# Patient Record
Sex: FEMALE | Race: WHITE | Employment: STUDENT | URBAN - METROPOLITAN AREA
[De-identification: names, ages, dates, MRNs, and addresses within clinical notes are randomized per-mention and may not be internally consistent; named-entity substitution may affect disease eponyms.]

---

## 2021-04-14 ENCOUNTER — OFFICE VISIT (OUTPATIENT)
Dept: FAMILY MEDICINE CLINIC | Facility: CLINIC | Age: 21
End: 2021-04-14

## 2021-04-14 VITALS
WEIGHT: 136 LBS | TEMPERATURE: 98 F | HEIGHT: 62 IN | HEART RATE: 115 BPM | BODY MASS INDEX: 25.03 KG/M2 | DIASTOLIC BLOOD PRESSURE: 87 MMHG | SYSTOLIC BLOOD PRESSURE: 132 MMHG

## 2021-04-14 DIAGNOSIS — J30.9 ALLERGIC RHINITIS, UNSPECIFIED SEASONALITY, UNSPECIFIED TRIGGER: Primary | ICD-10-CM

## 2021-04-14 PROCEDURE — 3079F DIAST BP 80-89 MM HG: CPT | Performed by: NURSE PRACTITIONER

## 2021-04-14 PROCEDURE — 3075F SYST BP GE 130 - 139MM HG: CPT | Performed by: NURSE PRACTITIONER

## 2021-04-14 PROCEDURE — 99203 OFFICE O/P NEW LOW 30 MIN: CPT | Performed by: NURSE PRACTITIONER

## 2021-04-14 PROCEDURE — 3008F BODY MASS INDEX DOCD: CPT | Performed by: NURSE PRACTITIONER

## 2021-04-14 RX ORDER — FLUTICASONE PROPIONATE 50 MCG
2 SPRAY, SUSPENSION (ML) NASAL DAILY
Qty: 3 BOTTLE | Refills: 3 | Status: SHIPPED | OUTPATIENT
Start: 2021-04-14 | End: 2022-04-09

## 2021-04-14 NOTE — PROGRESS NOTES
HPI    Patient presents for left ear drainage x 5 days which has now passed. Thought may be related to allergies since was also having some eye itchiness and congestion. Has been taking claritin and benadryl as needed with relief of symptoms.       With file    Social History Narrative      Not on file    Social Determinants of Health  Financial Resource Strain:       Difficulty of Paying Living Expenses:   Food Insecurity:       Worried About [address] in the Last Year:       Ran Out of Food in sounds: Normal heart sounds. No murmur heard. Pulmonary:      Effort: Pulmonary effort is normal. No respiratory distress. Breath sounds: Normal breath sounds. No stridor. No wheezing, rhonchi or rales. Chest:      Chest wall: No tenderness.    Torsten Kaur

## 2021-04-14 NOTE — PATIENT INSTRUCTIONS
Allergic Rhinitis  Allergic rhinitis is an allergic reaction that affects the nose, and often the eyes. It’s often known as nasal allergies. Nasal allergies are often due to things in the environment that are breathed in.  Depending what you are sensitive conditioner clean and free of mold. · Clean moldy areas with bleach and water. Don't mix bleach with other . In general:  · Vacuum once or twice a week. If possible, use a vacuum with a high-efficiency particulate air (HEPA) filter.   · Don't smok

## 2021-07-02 ENCOUNTER — TELEPHONE (OUTPATIENT)
Dept: FAMILY MEDICINE CLINIC | Facility: CLINIC | Age: 21
End: 2021-07-02

## 2021-07-02 ENCOUNTER — HOSPITAL ENCOUNTER (EMERGENCY)
Facility: HOSPITAL | Age: 21
Discharge: HOME | End: 2021-07-02
Attending: EMERGENCY MEDICINE
Payer: COMMERCIAL

## 2021-07-02 ENCOUNTER — OFFICE VISIT (OUTPATIENT)
Dept: INTERNAL MEDICINE CLINIC | Facility: CLINIC | Age: 21
End: 2021-07-02

## 2021-07-02 ENCOUNTER — APPOINTMENT (OUTPATIENT)
Dept: CT IMAGING | Facility: HOSPITAL | Age: 21
End: 2021-07-02
Attending: EMERGENCY MEDICINE
Payer: COMMERCIAL

## 2021-07-02 VITALS
RESPIRATION RATE: 19 BRPM | DIASTOLIC BLOOD PRESSURE: 66 MMHG | HEART RATE: 72 BPM | SYSTOLIC BLOOD PRESSURE: 107 MMHG | TEMPERATURE: 98 F | BODY MASS INDEX: 25.16 KG/M2 | OXYGEN SATURATION: 100 % | HEIGHT: 61.5 IN | WEIGHT: 135 LBS

## 2021-07-02 VITALS
HEART RATE: 81 BPM | BODY MASS INDEX: 25.09 KG/M2 | WEIGHT: 134.63 LBS | SYSTOLIC BLOOD PRESSURE: 122 MMHG | TEMPERATURE: 99 F | DIASTOLIC BLOOD PRESSURE: 76 MMHG | HEIGHT: 61.5 IN
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
## 2021-07-02 DIAGNOSIS — S39.91XA ABDOMINAL TRAUMA, INITIAL ENCOUNTER: Primary | ICD-10-CM

## 2021-07-02 DIAGNOSIS — T14.8XXA ABRASION: ICD-10-CM
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
## 2021-07-02 LAB
ANION GAP SERPL CALC-SCNC: 4 MMOL/L (ref 0–18)
B-HCG UR QL: NEGATIVE
B-HCG UR QL: NEGATIVE
BASOPHILS # BLD AUTO: 0.03 X10(3) UL (ref 0–0.2)
BASOPHILS NFR BLD AUTO: 0.4 %
BILIRUB UR QL: NEGATIVE
BILIRUB UR QL: NEGATIVE
BUN BLD-MCNC: 10 MG/DL (ref 7–18)
BUN/CREAT SERPL: 14.7 (ref 10–20)
CALCIUM BLD-MCNC: 10 MG/DL (ref 8.5–10.1)
CHLORIDE SERPL-SCNC: 107 MMOL/L (ref 98–112)
CLARITY UR: CLEAR
CLARITY UR: CLEAR
CO2 SERPL-SCNC: 28 MMOL/L (ref 21–32)
COLOR UR: COLORLESS
COLOR UR: COLORLESS
CREAT BLD-MCNC: 0.68 MG/DL
DEPRECATED RDW RBC AUTO: 46.8 FL (ref 35.1–46.3)
EOSINOPHIL # BLD AUTO: 0.02 X10(3) UL (ref 0–0.7)
EOSINOPHIL NFR BLD AUTO: 0.2 %
ERYTHROCYTE [DISTWIDTH] IN BLOOD BY AUTOMATED COUNT: 13.6 % (ref 11–15)
GLUCOSE BLD-MCNC: 96 MG/DL (ref 70–99)
GLUCOSE UR-MCNC: NEGATIVE MG/DL
GLUCOSE UR-MCNC: NEGATIVE MG/DL
HCT VFR BLD AUTO: 41.3 %
HGB BLD-MCNC: 13.2 G/DL
HGB UR QL STRIP.AUTO: NEGATIVE
HGB UR QL STRIP.AUTO: NEGATIVE
IMM GRANULOCYTES # BLD AUTO: 0.02 X10(3) UL (ref 0–1)
IMM GRANULOCYTES NFR BLD: 0.2 %
KETONES UR-MCNC: NEGATIVE MG/DL
KETONES UR-MCNC: NEGATIVE MG/DL
LYMPHOCYTES # BLD AUTO: 2.34 X10(3) UL (ref 1–4)
LYMPHOCYTES NFR BLD AUTO: 29.1 %
MCH RBC QN AUTO: 29.5 PG (ref 26–34)
MCHC RBC AUTO-ENTMCNC: 32 G/DL (ref 31–37)
MCV RBC AUTO: 92.4 FL
MONOCYTES # BLD AUTO: 0.43 X10(3) UL (ref 0.1–1)
MONOCYTES NFR BLD AUTO: 5.4 %
NEUTROPHILS # BLD AUTO: 5.19 X10 (3) UL (ref 1.5–7.7)
NEUTROPHILS # BLD AUTO: 5.19 X10(3) UL (ref 1.5–7.7)
NEUTROPHILS NFR BLD AUTO: 64.7 %
NITRITE UR QL STRIP.AUTO: NEGATIVE
NITRITE UR QL STRIP.AUTO: NEGATIVE
OSMOLALITY SERPL CALC.SUM OF ELEC: 287 MOSM/KG (ref 275–295)
PH UR: 7 [PH] (ref 5–8)
PH UR: 7 [PH] (ref 5–8)
PLATELET # BLD AUTO: 375 10(3)UL (ref 150–450)
POTASSIUM SERPL-SCNC: 4 MMOL/L (ref 3.5–5.1)
PROT UR-MCNC: NEGATIVE MG/DL
PROT UR-MCNC: NEGATIVE MG/DL
RBC # BLD AUTO: 4.47 X10(6)UL
SODIUM SERPL-SCNC: 139 MMOL/L (ref 136–145)
SP GR UR STRIP: 1 (ref 1–1.03)
SP GR UR STRIP: 1 (ref 1–1.03)
UROBILINOGEN UR STRIP-ACNC: <2
UROBILINOGEN UR STRIP-ACNC: <2
WBC # BLD AUTO: 8 X10(3) UL (ref 4–11)

## 2021-07-02 PROCEDURE — 3074F SYST BP LT 130 MM HG: CPT | Performed by: INTERNAL MEDICINE

## 2021-07-02 PROCEDURE — 99284 EMERGENCY DEPT VISIT MOD MDM: CPT

## 2021-07-02 PROCEDURE — 36415 COLL VENOUS BLD VENIPUNCTURE: CPT

## 2021-07-02 PROCEDURE — 85025 COMPLETE CBC W/AUTO DIFF WBC: CPT

## 2021-07-02 PROCEDURE — 74176 CT ABD & PELVIS W/O CONTRAST: CPT | Performed by: EMERGENCY MEDICINE

## 2021-07-02 PROCEDURE — 81025 URINE PREGNANCY TEST: CPT

## 2021-07-02 PROCEDURE — 80048 BASIC METABOLIC PNL TOTAL CA: CPT | Performed by: EMERGENCY MEDICINE

## 2021-07-02 PROCEDURE — 85025 COMPLETE CBC W/AUTO DIFF WBC: CPT | Performed by: EMERGENCY MEDICINE

## 2021-07-02 PROCEDURE — 99204 OFFICE O/P NEW MOD 45 MIN: CPT | Performed by: INTERNAL MEDICINE

## 2021-07-02 PROCEDURE — 81001 URINALYSIS AUTO W/SCOPE: CPT | Performed by: EMERGENCY MEDICINE

## 2021-07-02 PROCEDURE — 3078F DIAST BP <80 MM HG: CPT | Performed by: INTERNAL MEDICINE

## 2021-07-02 PROCEDURE — 80048 BASIC METABOLIC PNL TOTAL CA: CPT

## 2021-07-02 PROCEDURE — 3008F BODY MASS INDEX DOCD: CPT | Performed by: INTERNAL MEDICINE
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
## 2021-07-27 ENCOUNTER — HOSPITAL ENCOUNTER (OUTPATIENT)
Dept: GENERAL RADIOLOGY | Age: 21
Discharge: HOME | End: 2021-07-27
Attending: NURSE PRACTITIONER
Payer: COMMERCIAL

## 2021-07-27 ENCOUNTER — OFFICE VISIT (OUTPATIENT)
Dept: FAMILY MEDICINE CLINIC | Facility: CLINIC | Age: 21
End: 2021-07-27

## 2021-07-27 VITALS
HEIGHT: 61.5 IN | HEART RATE: 71 BPM | DIASTOLIC BLOOD PRESSURE: 80 MMHG | SYSTOLIC BLOOD PRESSURE: 123 MMHG | WEIGHT: 140 LBS | BODY MASS INDEX: 26.09 KG/M2

## 2021-07-27 DIAGNOSIS — M25.562 ACUTE PAIN OF LEFT KNEE: ICD-10-CM

## 2021-07-27 DIAGNOSIS — M62.830 SPASM OF THORACIC BACK MUSCLE: ICD-10-CM

## 2021-07-27 DIAGNOSIS — M79.605 LEFT LEG PAIN: Primary | ICD-10-CM

## 2021-07-27 DIAGNOSIS — M79.605 LEFT LEG PAIN: ICD-10-CM

## 2021-07-27 PROCEDURE — 3074F SYST BP LT 130 MM HG: CPT | Performed by: NURSE PRACTITIONER

## 2021-07-27 PROCEDURE — 73590 X-RAY EXAM OF LOWER LEG: CPT | Performed by: NURSE PRACTITIONER

## 2021-07-27 PROCEDURE — 73564 X-RAY EXAM KNEE 4 OR MORE: CPT | Performed by: NURSE PRACTITIONER

## 2021-07-27 PROCEDURE — 3008F BODY MASS INDEX DOCD: CPT | Performed by: NURSE PRACTITIONER

## 2021-07-27 PROCEDURE — 99214 OFFICE O/P EST MOD 30 MIN: CPT | Performed by: NURSE PRACTITIONER

## 2021-07-27 PROCEDURE — 3079F DIAST BP 80-89 MM HG: CPT | Performed by: NURSE PRACTITIONER

## 2021-07-27 RX ORDER — IBUPROFEN 600 MG/1
600 TABLET ORAL EVERY 6 HOURS PRN
Qty: 40 TABLET | Refills: 0 | Status: SHIPPED | OUTPATIENT
Start: 2021-07-27 | End: 2021-08-26

## 2021-07-27 RX ORDER — CYCLOBENZAPRINE HCL 5 MG
5 TABLET ORAL 3 TIMES DAILY PRN
Qty: 30 TABLET | Refills: 0 | Status: SHIPPED | OUTPATIENT
Start: 2021-07-27 | End: 2021-10-20 | Stop reason: ALTCHOICE

## 2021-08-10 ENCOUNTER — TELEPHONE (OUTPATIENT)
Dept: PHYSICAL THERAPY | Facility: HOSPITAL | Age: 21
End: 2021-08-10

## 2021-08-11 ENCOUNTER — TELEPHONE (OUTPATIENT)
Dept: PHYSICAL THERAPY | Facility: HOSPITAL | Age: 21
End: 2021-08-11

## 2021-08-11 ENCOUNTER — OFFICE VISIT (OUTPATIENT)
Dept: PHYSICAL THERAPY | Age: 21
End: 2021-08-11
Attending: NURSE PRACTITIONER
Payer: COMMERCIAL

## 2021-08-11 DIAGNOSIS — M79.605 LEFT LEG PAIN: ICD-10-CM

## 2021-08-11 DIAGNOSIS — M25.562 ACUTE PAIN OF LEFT KNEE: ICD-10-CM

## 2021-08-11 PROCEDURE — 97161 PT EVAL LOW COMPLEX 20 MIN: CPT | Performed by: PHYSICAL THERAPIST

## 2021-08-11 PROCEDURE — 97110 THERAPEUTIC EXERCISES: CPT | Performed by: PHYSICAL THERAPIST

## 2021-08-11 NOTE — PROGRESS NOTES
LOWER EXTREMITY EVALUATION:   Referring Physician: Kal DELCID  Diagnosis:L knee pain     Date of Service: 8/11/2021     PATIENT SUMMARY   Moses Devine is a 24year old female who presents to therapy today with complaints of L knee pain after bein reach functional goals. WIll focus on hip/knee strengthening open and closed chain, progress to plyometric as able.      Precautions:  None  OBJECTIVE:     Palpation: slight tenderness Lateral to patellar tendon L knee  Sensation: intact    AROM: (* denotes all activities. 3. Patient independent with HEP. Frequency / Duration: Patient will be seen for 2 x/week or a total of 10-12 visits over a 90 day period. Treatment will include:  Therapeutic Exercise and Home Exercise Program instruction    Education

## 2021-08-13 ENCOUNTER — OFFICE VISIT (OUTPATIENT)
Dept: PHYSICAL THERAPY | Age: 21
End: 2021-08-13
Attending: NURSE PRACTITIONER
Payer: COMMERCIAL

## 2021-08-13 DIAGNOSIS — M25.562 ACUTE PAIN OF LEFT KNEE: ICD-10-CM

## 2021-08-13 DIAGNOSIS — M79.605 LEFT LEG PAIN: ICD-10-CM

## 2021-08-13 PROCEDURE — 97110 THERAPEUTIC EXERCISES: CPT | Performed by: PHYSICAL THERAPIST

## 2021-08-13 NOTE — PROGRESS NOTES
Dx: L Knee pain        Insurance (Authorized # of Visits):  2           Authorizing Physician: FARHANA Moeller MD visit: none scheduled  Fall Risk: standard         Precautions: n/a             Subjective: Patient reports had pain at knee the other

## 2021-08-18 ENCOUNTER — TELEPHONE (OUTPATIENT)
Dept: PHYSICAL THERAPY | Facility: HOSPITAL | Age: 21
End: 2021-08-18

## 2021-08-18 ENCOUNTER — OFFICE VISIT (OUTPATIENT)
Dept: PHYSICAL THERAPY | Age: 21
End: 2021-08-18
Attending: NURSE PRACTITIONER
Payer: COMMERCIAL

## 2021-08-18 PROCEDURE — 97110 THERAPEUTIC EXERCISES: CPT | Performed by: PHYSICAL THERAPIST

## 2021-08-18 NOTE — PROGRESS NOTES
Dx: L Knee pain, L hip pain        Insurance (Authorized # of Visits):  3           Authorizing Physician: FARHANA Gonzalez Next MD visit: none scheduled  Fall Risk: standard         Precautions: n/a             Subjective: Patient reports able to comple

## 2021-08-20 ENCOUNTER — OFFICE VISIT (OUTPATIENT)
Dept: PHYSICAL THERAPY | Age: 21
End: 2021-08-20
Attending: NURSE PRACTITIONER
Payer: COMMERCIAL

## 2021-08-20 PROCEDURE — 97110 THERAPEUTIC EXERCISES: CPT | Performed by: PHYSICAL THERAPIST

## 2021-08-20 NOTE — PROGRESS NOTES
Dx: L Knee pain, L hip pain        Insurance (Authorized # of Visits):  4           Authorizing Physician: FARHANA Pratt Next MD visit: none scheduled  Fall Risk: standard         Precautions: n/a             Subjective: Patient reports hip is feeling

## 2021-08-24 ENCOUNTER — OFFICE VISIT (OUTPATIENT)
Dept: PHYSICAL THERAPY | Age: 21
End: 2021-08-24
Attending: NURSE PRACTITIONER
Payer: COMMERCIAL

## 2021-08-24 PROCEDURE — 97110 THERAPEUTIC EXERCISES: CPT | Performed by: PHYSICAL THERAPIST

## 2021-08-24 NOTE — PROGRESS NOTES
Dx: L Knee pain, L hip pain        Insurance (Authorized # of Visits):  5/8          Authorizing Physician: FARHANA Powell Next MD visit: none scheduled  Fall Risk: standard         Precautions: n/a             Subjective: Patient reports hip is feelin in place jumps    Charges: Ex 3      Total Timed Treatment: 45 min  Total Treatment Time: 45 min

## 2021-08-25 ENCOUNTER — TELEPHONE (OUTPATIENT)
Dept: PHYSICAL THERAPY | Facility: HOSPITAL | Age: 21
End: 2021-08-25

## 2021-08-26 ENCOUNTER — APPOINTMENT (OUTPATIENT)
Dept: PHYSICAL THERAPY | Age: 21
End: 2021-08-26
Attending: NURSE PRACTITIONER
Payer: COMMERCIAL

## 2021-09-09 ENCOUNTER — OFFICE VISIT (OUTPATIENT)
Dept: PHYSICAL THERAPY | Age: 21
End: 2021-09-09
Attending: NURSE PRACTITIONER
Payer: COMMERCIAL

## 2021-09-09 PROCEDURE — 97110 THERAPEUTIC EXERCISES: CPT | Performed by: PHYSICAL THERAPIST

## 2021-09-09 NOTE — PROGRESS NOTES
Dx: L Knee pain, L hip pain        Insurance (Authorized # of Visits):  6/8          Authorizing Physician: FARHANA Lynch Next MD visit: none scheduled  Fall Risk: standard         Precautions: n/a             Subjective: Patient reports L knee has be

## 2021-09-16 ENCOUNTER — OFFICE VISIT (OUTPATIENT)
Dept: PHYSICAL THERAPY | Age: 21
End: 2021-09-16
Attending: NURSE PRACTITIONER
Payer: COMMERCIAL

## 2021-09-16 PROCEDURE — 97110 THERAPEUTIC EXERCISES: CPT | Performed by: PHYSICAL THERAPIST

## 2021-09-16 NOTE — PROGRESS NOTES
Dx: L Knee pain, L hip pain        Insurance (Authorized # of Visits):  7/8          Authorizing Physician: FARHANA Allred Next MD visit: none scheduled  Fall Risk: standard         Precautions: n/a             Subjective: Patient reports knee and hip

## 2021-09-23 ENCOUNTER — OFFICE VISIT (OUTPATIENT)
Dept: FAMILY MEDICINE CLINIC | Facility: CLINIC | Age: 21
End: 2021-09-23
Payer: COMMERCIAL

## 2021-09-23 ENCOUNTER — OFFICE VISIT (OUTPATIENT)
Dept: PHYSICAL THERAPY | Age: 21
End: 2021-09-23
Attending: NURSE PRACTITIONER
Payer: COMMERCIAL

## 2021-09-23 VITALS
WEIGHT: 139 LBS | BODY MASS INDEX: 25.91 KG/M2 | HEART RATE: 69 BPM | SYSTOLIC BLOOD PRESSURE: 123 MMHG | TEMPERATURE: 99 F | DIASTOLIC BLOOD PRESSURE: 73 MMHG | HEIGHT: 61.5 IN

## 2021-09-23 DIAGNOSIS — Z87.828 HISTORY OF MOTOR VEHICLE ACCIDENT: Primary | ICD-10-CM

## 2021-09-23 PROCEDURE — 3074F SYST BP LT 130 MM HG: CPT | Performed by: NURSE PRACTITIONER

## 2021-09-23 PROCEDURE — 99213 OFFICE O/P EST LOW 20 MIN: CPT | Performed by: NURSE PRACTITIONER

## 2021-09-23 PROCEDURE — 3008F BODY MASS INDEX DOCD: CPT | Performed by: NURSE PRACTITIONER

## 2021-09-23 PROCEDURE — 3078F DIAST BP <80 MM HG: CPT | Performed by: NURSE PRACTITIONER

## 2021-09-23 PROCEDURE — 97110 THERAPEUTIC EXERCISES: CPT | Performed by: PHYSICAL THERAPIST

## 2021-09-23 NOTE — PROGRESS NOTES
HPI    Patient presents for follow up for spasm of thoracic back, left leg and left knee pain. Completed PT today. Pain is improved. No longer taking pain medications for relief. Review of Systems   All other systems reviewed and are negative. the Last Year: Not on file  Transportation Needs:       Lack of Transportation (Medical): Not on file      Lack of Transportation (Non-Medical):  Not on file  Physical Activity:       Days of Exercise per Week: Not on file      Minutes of Exercise per [facility] Breath sounds: Normal breath sounds. No stridor. No wheezing, rhonchi or rales. Chest:      Chest wall: No tenderness. Skin:     General: Skin is warm and dry. Neurological:      Mental Status: She is alert and oriented to person, place, and time.

## 2021-09-23 NOTE — PROGRESS NOTES
Dx: L Knee pain, L hip pain        Insurance (Authorized # of Visits):  8/8          Authorizing Physician: [facility]-FARHANA LANCASTER Next MD visit: none scheduled  Fall Risk: standard         Precautions: n/a           DIscharge Summary  Subjective: Patient repo band  Single leg bridge x 15        Single leg balance with ball toss on air ex x 12 frwd, side R, side L  Inverted Bosu squats x 15  Bosu to trampoline various angles x 15  Side hip abd walks blueTB x 15 each  Frwd/back diagonal walks blueTB x 15 frwd, 15

## 2021-10-20 ENCOUNTER — OFFICE VISIT (OUTPATIENT)
Dept: FAMILY MEDICINE CLINIC | Facility: CLINIC | Age: 21
End: 2021-10-20

## 2021-10-20 VITALS
DIASTOLIC BLOOD PRESSURE: 77 MMHG | HEIGHT: 61.5 IN | WEIGHT: 134 LBS | SYSTOLIC BLOOD PRESSURE: 111 MMHG | HEART RATE: 121 BPM | BODY MASS INDEX: 24.98 KG/M2 | TEMPERATURE: 98 F

## 2021-10-20 DIAGNOSIS — Z23 ENCOUNTER FOR ADMINISTRATION OF VACCINE: ICD-10-CM

## 2021-10-20 DIAGNOSIS — Z00.00 WELL ADULT EXAM: Primary | ICD-10-CM

## 2021-10-20 PROCEDURE — 3074F SYST BP LT 130 MM HG: CPT | Performed by: NURSE PRACTITIONER

## 2021-10-20 PROCEDURE — 99395 PREV VISIT EST AGE 18-39: CPT | Performed by: NURSE PRACTITIONER

## 2021-10-20 PROCEDURE — 3078F DIAST BP <80 MM HG: CPT | Performed by: NURSE PRACTITIONER

## 2021-10-20 PROCEDURE — 3008F BODY MASS INDEX DOCD: CPT | Performed by: NURSE PRACTITIONER

## 2021-10-20 PROCEDURE — 90471 IMMUNIZATION ADMIN: CPT | Performed by: NURSE PRACTITIONER

## 2021-10-20 PROCEDURE — 90715 TDAP VACCINE 7 YRS/> IM: CPT | Performed by: NURSE PRACTITIONER

## 2021-10-20 NOTE — PROGRESS NOTES
HPI    Patient presents for annual physical.  Negative for past medical history. Gyne history - G0. Last pap - not sexually active. LMP -   Diet - diet is healthy. Exercise - exercises regularly.     Immunizations - refusing flu shot, would like Marital status: Single      Spouse name: Not on file      Number of children: Not on file      Years of education: Not on file      Highest education level: Not on file    Occupational History      Not on file    Tobacco Use      Smoking status: Never Sm MCG/ACT Nasal Suspension 2 sprays by Each Nare route daily. 3 Bottle 3       Allergies:  No Known Allergies    Physical Exam  Vitals and nursing note reviewed. Constitutional:       General: She is not in acute distress.      Appearance: Normal appearance Well adult exam    -  Primary    Relevant Orders    TETANUS, DIPHTHERIA TOXOIDS AND ACELLULAR PERTUSIS VACCINE (TDAP), >7 YEARS, IM USE (Completed)    LIPID PANEL    COMP METABOLIC PANEL (14)    CBC WITH DIFFERENTIAL WITH PLATELET    TSH W REFLEX TO FREE T

## 2021-11-24 ENCOUNTER — LAB ENCOUNTER (OUTPATIENT)
Dept: LAB | Age: 21
End: 2021-11-24
Attending: NURSE PRACTITIONER
Payer: COMMERCIAL

## 2021-11-24 DIAGNOSIS — Z00.00 WELL ADULT EXAM: ICD-10-CM

## 2021-11-24 PROCEDURE — 85025 COMPLETE CBC W/AUTO DIFF WBC: CPT

## 2021-11-24 PROCEDURE — 80061 LIPID PANEL: CPT

## 2021-11-24 PROCEDURE — 80053 COMPREHEN METABOLIC PANEL: CPT

## 2021-11-24 PROCEDURE — 36415 COLL VENOUS BLD VENIPUNCTURE: CPT

## 2021-11-24 PROCEDURE — 84443 ASSAY THYROID STIM HORMONE: CPT

## 2023-05-05 ENCOUNTER — OFFICE VISIT (OUTPATIENT)
Dept: FAMILY MEDICINE CLINIC | Facility: CLINIC | Age: 23
End: 2023-05-05

## 2023-05-05 VITALS
HEART RATE: 80 BPM | DIASTOLIC BLOOD PRESSURE: 84 MMHG | BODY MASS INDEX: 24.23 KG/M2 | HEIGHT: 61.5 IN | SYSTOLIC BLOOD PRESSURE: 131 MMHG | WEIGHT: 130 LBS

## 2023-05-05 DIAGNOSIS — R05.1 ACUTE COUGH: Primary | ICD-10-CM

## 2023-05-05 DIAGNOSIS — R09.82 PND (POST-NASAL DRIP): ICD-10-CM

## 2023-05-05 DIAGNOSIS — J30.0 VASOMOTOR RHINITIS: ICD-10-CM

## 2023-05-05 RX ORDER — FLUTICASONE PROPIONATE 50 MCG
2 SPRAY, SUSPENSION (ML) NASAL DAILY
Qty: 16 G | Refills: 2 | Status: SHIPPED | OUTPATIENT
Start: 2023-05-05 | End: 2023-06-04

## 2023-05-05 RX ORDER — BENZONATATE 100 MG/1
100 CAPSULE ORAL 3 TIMES DAILY PRN
Qty: 30 CAPSULE | Refills: 0 | Status: SHIPPED | OUTPATIENT
Start: 2023-05-05

## 2023-05-23 ENCOUNTER — OFFICE VISIT (OUTPATIENT)
Dept: FAMILY MEDICINE CLINIC | Facility: CLINIC | Age: 23
End: 2023-05-23

## 2023-05-23 VITALS
BODY MASS INDEX: 24.6 KG/M2 | HEART RATE: 67 BPM | HEIGHT: 61.5 IN | WEIGHT: 132 LBS | SYSTOLIC BLOOD PRESSURE: 122 MMHG | DIASTOLIC BLOOD PRESSURE: 78 MMHG

## 2023-05-23 DIAGNOSIS — E55.9 VITAMIN D DEFICIENCY: ICD-10-CM

## 2023-05-23 DIAGNOSIS — Z00.00 ROUTINE MEDICAL EXAM: Primary | ICD-10-CM

## 2023-05-23 PROCEDURE — 3008F BODY MASS INDEX DOCD: CPT | Performed by: FAMILY MEDICINE

## 2023-05-23 PROCEDURE — 3074F SYST BP LT 130 MM HG: CPT | Performed by: FAMILY MEDICINE

## 2023-05-23 PROCEDURE — 99395 PREV VISIT EST AGE 18-39: CPT | Performed by: FAMILY MEDICINE

## 2023-05-23 PROCEDURE — 3078F DIAST BP <80 MM HG: CPT | Performed by: FAMILY MEDICINE

## 2023-05-27 ENCOUNTER — LAB ENCOUNTER (OUTPATIENT)
Dept: LAB | Facility: REFERENCE LAB | Age: 23
End: 2023-05-27
Attending: FAMILY MEDICINE
Payer: COMMERCIAL

## 2023-05-27 DIAGNOSIS — Z00.00 ROUTINE MEDICAL EXAM: ICD-10-CM

## 2023-05-27 DIAGNOSIS — E55.9 VITAMIN D DEFICIENCY: ICD-10-CM

## 2023-05-27 LAB
ALBUMIN SERPL-MCNC: 4.1 G/DL (ref 3.4–5)
ALBUMIN/GLOB SERPL: 1.2 {RATIO} (ref 1–2)
ALP LIVER SERPL-CCNC: 66 U/L
ALT SERPL-CCNC: 17 U/L
ANION GAP SERPL CALC-SCNC: 8 MMOL/L (ref 0–18)
AST SERPL-CCNC: 12 U/L (ref 15–37)
BASOPHILS # BLD AUTO: 0.02 X10(3) UL (ref 0–0.2)
BASOPHILS NFR BLD AUTO: 0.3 %
BILIRUB SERPL-MCNC: 1.9 MG/DL (ref 0.1–2)
BUN BLD-MCNC: 8 MG/DL (ref 7–18)
BUN/CREAT SERPL: 11.4 (ref 10–20)
CALCIUM BLD-MCNC: 9.3 MG/DL (ref 8.5–10.1)
CHLORIDE SERPL-SCNC: 109 MMOL/L (ref 98–112)
CHOLEST SERPL-MCNC: 151 MG/DL (ref ?–200)
CO2 SERPL-SCNC: 24 MMOL/L (ref 21–32)
CREAT BLD-MCNC: 0.7 MG/DL
DEPRECATED RDW RBC AUTO: 47.4 FL (ref 35.1–46.3)
EOSINOPHIL # BLD AUTO: 0.07 X10(3) UL (ref 0–0.7)
EOSINOPHIL NFR BLD AUTO: 1.2 %
ERYTHROCYTE [DISTWIDTH] IN BLOOD BY AUTOMATED COUNT: 14 % (ref 11–15)
FASTING PATIENT LIPID ANSWER: YES
FASTING STATUS PATIENT QL REPORTED: YES
GFR SERPLBLD BASED ON 1.73 SQ M-ARVRAT: 125 ML/MIN/1.73M2 (ref 60–?)
GLOBULIN PLAS-MCNC: 3.5 G/DL (ref 2.8–4.4)
GLUCOSE BLD-MCNC: 85 MG/DL (ref 70–99)
HCT VFR BLD AUTO: 38.6 %
HDLC SERPL-MCNC: 72 MG/DL (ref 40–59)
HGB BLD-MCNC: 12.4 G/DL
IMM GRANULOCYTES # BLD AUTO: 0 X10(3) UL (ref 0–1)
IMM GRANULOCYTES NFR BLD: 0 %
LDLC SERPL CALC-MCNC: 69 MG/DL (ref ?–100)
LYMPHOCYTES # BLD AUTO: 3.05 X10(3) UL (ref 1–4)
LYMPHOCYTES NFR BLD AUTO: 51.9 %
MCH RBC QN AUTO: 29.7 PG (ref 26–34)
MCHC RBC AUTO-ENTMCNC: 32.1 G/DL (ref 31–37)
MCV RBC AUTO: 92.6 FL
MONOCYTES # BLD AUTO: 0.4 X10(3) UL (ref 0.1–1)
MONOCYTES NFR BLD AUTO: 6.8 %
NEUTROPHILS # BLD AUTO: 2.34 X10 (3) UL (ref 1.5–7.7)
NEUTROPHILS # BLD AUTO: 2.34 X10(3) UL (ref 1.5–7.7)
NEUTROPHILS NFR BLD AUTO: 39.8 %
NONHDLC SERPL-MCNC: 79 MG/DL (ref ?–130)
OSMOLALITY SERPL CALC.SUM OF ELEC: 290 MOSM/KG (ref 275–295)
PLATELET # BLD AUTO: 337 10(3)UL (ref 150–450)
POTASSIUM SERPL-SCNC: 4 MMOL/L (ref 3.5–5.1)
PROT SERPL-MCNC: 7.6 G/DL (ref 6.4–8.2)
RBC # BLD AUTO: 4.17 X10(6)UL
SODIUM SERPL-SCNC: 141 MMOL/L (ref 136–145)
TRIGL SERPL-MCNC: 43 MG/DL (ref 30–149)
TSI SER-ACNC: 2.4 MIU/ML (ref 0.36–3.74)
VIT B12 SERPL-MCNC: 272 PG/ML (ref 193–986)
VIT D+METAB SERPL-MCNC: 36.9 NG/ML (ref 30–100)
VLDLC SERPL CALC-MCNC: 6 MG/DL (ref 0–30)
WBC # BLD AUTO: 5.9 X10(3) UL (ref 4–11)

## 2023-05-27 PROCEDURE — 85025 COMPLETE CBC W/AUTO DIFF WBC: CPT

## 2023-05-27 PROCEDURE — 80053 COMPREHEN METABOLIC PANEL: CPT

## 2023-05-27 PROCEDURE — 80061 LIPID PANEL: CPT

## 2023-05-27 PROCEDURE — 82607 VITAMIN B-12: CPT

## 2023-05-27 PROCEDURE — 36415 COLL VENOUS BLD VENIPUNCTURE: CPT

## 2023-05-27 PROCEDURE — 84443 ASSAY THYROID STIM HORMONE: CPT

## 2023-05-27 PROCEDURE — 82306 VITAMIN D 25 HYDROXY: CPT

## 2023-10-06 ENCOUNTER — OFFICE VISIT (OUTPATIENT)
Dept: FAMILY MEDICINE CLINIC | Facility: CLINIC | Age: 23
End: 2023-10-06

## 2023-10-06 VITALS
BODY MASS INDEX: 24.98 KG/M2 | HEIGHT: 61.5 IN | HEART RATE: 68 BPM | WEIGHT: 134 LBS | SYSTOLIC BLOOD PRESSURE: 130 MMHG | DIASTOLIC BLOOD PRESSURE: 80 MMHG

## 2023-10-06 DIAGNOSIS — B07.9 WART OF HAND: Primary | ICD-10-CM

## 2023-10-06 PROCEDURE — 3079F DIAST BP 80-89 MM HG: CPT | Performed by: NURSE PRACTITIONER

## 2023-10-06 PROCEDURE — 3075F SYST BP GE 130 - 139MM HG: CPT | Performed by: NURSE PRACTITIONER

## 2023-10-06 PROCEDURE — 99213 OFFICE O/P EST LOW 20 MIN: CPT | Performed by: NURSE PRACTITIONER

## 2023-10-06 PROCEDURE — 3008F BODY MASS INDEX DOCD: CPT | Performed by: NURSE PRACTITIONER

## 2023-10-07 ENCOUNTER — PATIENT MESSAGE (OUTPATIENT)
Dept: FAMILY MEDICINE CLINIC | Facility: CLINIC | Age: 23
End: 2023-10-07

## 2023-10-09 ENCOUNTER — TELEPHONE (OUTPATIENT)
Dept: CASE MANAGEMENT | Age: 23
End: 2023-10-09

## 2023-10-09 ENCOUNTER — TELEPHONE (OUTPATIENT)
Dept: FAMILY MEDICINE CLINIC | Facility: CLINIC | Age: 23
End: 2023-10-09

## 2023-10-09 DIAGNOSIS — B07.9 WART OF HAND: Primary | ICD-10-CM

## 2023-10-09 NOTE — TELEPHONE ENCOUNTER
Patient is requesting referral.     Patient is also requesting PCP note the [employer] message dated 10/9/2023     Name of specialist and specialty department :   Dermatologist    Reason for visit with the specialist:  wart on left hand , left thumb   Per office visit on 10/6/2023 with PCPs office    Patient mentions if able to note on the referral the bump/cyst like on the left toe to be examined to dermatologist as well    Address of the specialist office: [facility]    Appointment date: None         CSS informed patient the turnaround time for referral is 5-7 business days.  Patient was informed to check their [employer] account for referral status.

## 2023-10-09 NOTE — TELEPHONE ENCOUNTER
Referral order signed for wart of hand. Will not code for cyst of toe as I did not see or assess her for this.

## 2023-10-09 NOTE — TELEPHONE ENCOUNTER
Cristian Slater,     Patient is requesting a referral to an [facility] dermatologist.      Please provide the name of the specialist and the DX. Patient said she has a wart on hand and cyst on toe. Pended referral please review diagnosis and sign off if you agree. Thank you.   Kenny Rutledge

## 2023-10-12 ENCOUNTER — OFFICE VISIT (OUTPATIENT)
Dept: DERMATOLOGY CLINIC | Facility: CLINIC | Age: 23
End: 2023-10-12

## 2023-10-12 DIAGNOSIS — L84 CALLUS: ICD-10-CM

## 2023-10-12 DIAGNOSIS — B07.9 VERRUCA(E): Primary | ICD-10-CM

## 2023-10-12 NOTE — PROGRESS NOTES
HPI:    Patient ID: Jose Bowden is a 21year old female. Patient presents with warts on left hand. States it has been on there for the past 1 year now. On left foot has a bump on it. Not sure if it's a wart. Patient would like a full skin exam.         Review of Systems   Constitutional:  Negative for chills and fever. Musculoskeletal:  Negative for arthralgias and myalgias. Skin:  Positive for rash. Negative for color change and wound. No current outpatient medications on file. Allergies:No Known Allergies   LMP 10/07/2023 (Exact Date)   There is no height or weight on file to calculate BMI. PHYSICAL EXAM:   Physical Exam  Constitutional:       General: She is not in acute distress. Appearance: Normal appearance. Skin:     General: Skin is warm and dry. Findings: Rash present. Comments: Wart noted on the left thumb. No draining or tenderness noted. About 2-3 mm in size. Callus noted on the left large toe. No draining or tenderness noted. Neurological:      Mental Status: She is alert and oriented to person, place, and time. ASSESSMENT/PLAN:   1. Verruca(e)  -After discussion with patient, advised the following:  - Cryosurgery of non-malignant lesion(s)  - Risks, benefits, alternatives and personnel required for cryosurgery reviewed with patient. Discussed the expected redness, as well as the risks of swelling, blistering, crusting, pigmentary changes, and permanent scarring. . Discussed that lesion may need additional treatments in future or may recur. Pt verbalizes understanding and wishes to proceed. - Cryosurgery performed with Liquid Nitrogen via cryostat spray gun to warts. 1 lesion(s) treated. - Patient tolerated well and wound care discussed. -Pt was agreeable to plan and will comply with discussion above.        2. Callus  -After discussion with patient, advised the following:  -Benign lesion  -Can use callus patches to help remove the lesion  -Will take several weeks to resolve. -Return if there are changes.   -To call or follow-up with worsening symptoms or concerns.   -Pt was agreeable to plan and will comply with discussion above. No orders of the defined types were placed in this encounter.       Meds This Visit:  Requested Prescriptions      No prescriptions requested or ordered in this encounter       Imaging & Referrals:  None         #[id number]

## 2023-11-03 ENCOUNTER — PATIENT MESSAGE (OUTPATIENT)
Dept: FAMILY MEDICINE CLINIC | Facility: CLINIC | Age: 23
End: 2023-11-03

## 2023-11-03 ENCOUNTER — OFFICE VISIT (OUTPATIENT)
Dept: FAMILY MEDICINE CLINIC | Facility: CLINIC | Age: 23
End: 2023-11-03

## 2023-11-03 ENCOUNTER — HOSPITAL ENCOUNTER (OUTPATIENT)
Dept: GENERAL RADIOLOGY | Age: 23
Discharge: HOME | End: 2023-11-03
Attending: NURSE PRACTITIONER
Payer: COMMERCIAL

## 2023-11-03 VITALS
HEART RATE: 101 BPM | BODY MASS INDEX: 25 KG/M2 | WEIGHT: 134.38 LBS | DIASTOLIC BLOOD PRESSURE: 80 MMHG | SYSTOLIC BLOOD PRESSURE: 123 MMHG

## 2023-11-03 DIAGNOSIS — M79.672 LEFT FOOT PAIN: ICD-10-CM

## 2023-11-03 DIAGNOSIS — B07.9 VERRUCA(E): Primary | ICD-10-CM

## 2023-11-03 DIAGNOSIS — B35.1 ONYCHOMYCOSIS: Primary | ICD-10-CM

## 2023-11-03 DIAGNOSIS — B35.1 ONYCHOMYCOSIS: ICD-10-CM

## 2023-11-03 PROCEDURE — 3074F SYST BP LT 130 MM HG: CPT | Performed by: NURSE PRACTITIONER

## 2023-11-03 PROCEDURE — 99214 OFFICE O/P EST MOD 30 MIN: CPT | Performed by: NURSE PRACTITIONER

## 2023-11-03 PROCEDURE — 17110 DESTRUCTION B9 LES UP TO 14: CPT | Performed by: NURSE PRACTITIONER

## 2023-11-03 PROCEDURE — 3079F DIAST BP 80-89 MM HG: CPT | Performed by: NURSE PRACTITIONER

## 2023-11-03 PROCEDURE — 73630 X-RAY EXAM OF FOOT: CPT | Performed by: NURSE PRACTITIONER

## 2023-11-04 NOTE — TELEPHONE ENCOUNTER
Rosetta Sanchez,APRN=see message . Imaging Xray 11/3/23; Masoud,     Your xray is normal.  Please try insoles as we discussed and follow up with podiatry as needed. Sil Monroy, GRIFFINP-BC   Written by FARHANA Forrester on 11/3/2023  2:22 PM CDT  Seen by patient Yessi Jarvis on 11/3/2023  2:53 PM        From: Yessi Jarvis  To: Phill Norris  Sent: 11/3/2023  2:58 PM CDT  Subject: Left Foot XR    Hi - saw your note regarding that everything looks normal from my foot x-ray test results and that I should invest in insoles. As I read results, I was wondering what this meant: An accessory ossicle seen adjacent to cuboid bone. Thanks!   Masoud

## 2023-11-07 ENCOUNTER — TELEPHONE (OUTPATIENT)
Dept: FAMILY MEDICINE CLINIC | Facility: CLINIC | Age: 23
End: 2023-11-07

## 2023-11-09 NOTE — TELEPHONE ENCOUNTER
Medication denied. Per denial: you must have a poor response to an oral antifungal drug unless there are side effects to medication.   Treatment plan must include removal of unattached, infected nail   Must have diabetes, peripheral vascular insufficiency  Weekened immune system :cancer HIV, AIDS or anti rejection drugs due to organ implant etc

## 2023-11-09 NOTE — TELEPHONE ENCOUNTER
Prescribed by Mohan Reynolds.  Can use otc clotrimazole cream and rub into nail- at base and whole nail that is affected - 2-3 times a day

## 2023-11-16 ENCOUNTER — OFFICE VISIT (OUTPATIENT)
Dept: INTERNAL MEDICINE CLINIC | Facility: CLINIC | Age: 23
End: 2023-11-16

## 2023-11-16 ENCOUNTER — TELEPHONE (OUTPATIENT)
Dept: INTERNAL MEDICINE CLINIC | Facility: CLINIC | Age: 23
End: 2023-11-16

## 2023-11-16 ENCOUNTER — NURSE TRIAGE (OUTPATIENT)
Dept: FAMILY MEDICINE CLINIC | Facility: CLINIC | Age: 23
End: 2023-11-16

## 2023-11-16 VITALS
DIASTOLIC BLOOD PRESSURE: 87 MMHG | HEART RATE: 101 BPM | OXYGEN SATURATION: 98 % | SYSTOLIC BLOOD PRESSURE: 135 MMHG | WEIGHT: 134 LBS | HEIGHT: 61.5 IN | BODY MASS INDEX: 24.98 KG/M2

## 2023-11-16 DIAGNOSIS — J02.0 PHARYNGITIS DUE TO STREPTOCOCCUS SPECIES: Primary | ICD-10-CM

## 2023-11-16 LAB
CONTROL LINE PRESENT WITH A CLEAR BACKGROUND (YES/NO): YES YES/NO
KIT LOT #: 7283 NUMERIC
STREP GRP A CUL-SCR: POSITIVE

## 2023-11-16 PROCEDURE — 3075F SYST BP GE 130 - 139MM HG: CPT

## 2023-11-16 PROCEDURE — 3079F DIAST BP 80-89 MM HG: CPT

## 2023-11-16 PROCEDURE — 99214 OFFICE O/P EST MOD 30 MIN: CPT

## 2023-11-16 PROCEDURE — 3008F BODY MASS INDEX DOCD: CPT

## 2023-11-16 PROCEDURE — 87880 STREP A ASSAY W/OPTIC: CPT

## 2023-11-16 RX ORDER — PENICILLIN V POTASSIUM 500 MG/1
500 TABLET ORAL 3 TIMES DAILY
Qty: 30 TABLET | Refills: 0 | Status: SHIPPED | OUTPATIENT
Start: 2023-11-16 | End: 2023-11-26

## 2023-11-16 NOTE — TELEPHONE ENCOUNTER
Prior Authorization      Ciclopirox 8 % External Solution, Apply 1 Application topically nightly.  (Patient not taking: Reported on 11/16/2023), Disp: 6 mL, Rfl: 0      [id number]

## 2023-11-16 NOTE — TELEPHONE ENCOUNTER
Action Requested: Summary for Provider     []  Critical Lab, Recommendations Needed  [] Need Additional Advice  []   FYI    []   Need Orders  [] Need Medications Sent to Pharmacy  []  Other     SUMMARY: Per protocol advised : Office visit   Future Appointments   Date Time Provider Eva Johnsoni   2023 11:30 AM FARHANA Sharp   2023  8:15 AM ALIVIA Lantigua         Reason for call: Cough  Onset: Data Unavailable    Patient calling ( identified name and  ) states had sore throat and body aches yesterday , this morning coughed up mucus and blood twice this morning , lymph nodes are swollen   Able to eat, drink and swallow but with some  pain       Denies fever,no chest pain ,no SOB     Appointment made     See care advice given     Patient verbalizes understanding and agrees with plan.    Reason for Disposition   Coughing up tomasa-colored (reddish-brown) or blood-tinged sputum    Protocols used: Cough-A-OH

## 2023-11-17 NOTE — TELEPHONE ENCOUNTER
Duplicate request se TE 11/09/23 Medication denied          View all authorizations for this medication   Denied   11/9/2023 11:02 AM  Case ID: [id number] Appeal supported: No   Note from payer: Details of this decision are provided on the physician outcome notice which has been faxed to the number on file. Payer: [address] [place] [address] Road    [phone]      Denied   11/9/2023  9:26 AM  Case ID: [id number] Sending user: Rick Wallis, [address] [place] [address]   Payer: [address] [place] [address] Road    [phone]    [address]   11/9/2023  9:26 AM  Case ID: [id number] Cancel reason: Other Sending user: Rick Wallis, [address] result: Other   Note from payer: PA is in process. Cannot cancel request.   Note to payer: The prior authorization request was processed in a non-electronic way.    Payer: [address] [place] [address] Road    [phone]

## 2023-12-01 ENCOUNTER — OFFICE VISIT (OUTPATIENT)
Dept: PODIATRY CLINIC | Facility: CLINIC | Age: 23
End: 2023-12-01

## 2023-12-01 DIAGNOSIS — M21.622 TAILOR'S BUNION OF LEFT FOOT: Primary | ICD-10-CM

## 2023-12-01 DIAGNOSIS — M79.2 NEURITIS: ICD-10-CM

## 2023-12-01 PROCEDURE — 99203 OFFICE O/P NEW LOW 30 MIN: CPT | Performed by: STUDENT IN AN ORGANIZED HEALTH CARE EDUCATION/TRAINING PROGRAM

## 2023-12-01 NOTE — PROGRESS NOTES
[facility] Podiatry  Progress Note      Desmond Vazquez is a 21year old female. Chief Complaint   Patient presents with    Foot Pain     New pt- L foot- onset-5/2023- denies injury- rates pain  5-6/10 on and off- has some numbness and tingling- has xray in the system- pt stated she also referred here for bilateral foot toenail fungus             HPI:     Patient is a pleasant 29-year-old female presents to clinic today complaining of left foot lateral numbness and concern for great toenail fungus. Patient relates that she is physically active and has noticed numbness and tingling on the outside of her left foot especially when wearing narrow shoes or very physically active. Denies any injuries or trauma patient also has concerns of bilateral great toenail discoloration. She uses clotrimazole cream.        Allergies: Patient has no known allergies. Current Outpatient Medications   Medication Sig Dispense Refill    Ciclopirox 8 % External Solution Apply 1 Application topically nightly. (Patient not taking: Reported on 11/16/2023) 6 mL 0      History reviewed. No pertinent past medical history. History reviewed. No pertinent surgical history. Family History   Family history unknown: Yes      Social History     Socioeconomic History    Marital status: Single   Tobacco Use    Smoking status: Never    Smokeless tobacco: Never   Vaping Use    Vaping Use: Never used   Substance and Sexual Activity    Alcohol use: Yes     Comment: occassionally    Drug use: Never   Other Topics Concern    Reaction to local anesthetic No    Pt has a pacemaker No    Pt has a defibrillator No           REVIEW OF SYSTEMS:     Denies nause, fever, chills  No calf pain  Denies chest pain or SOB      EXAM:   [facility] 11/05/2023 (Exact Date)   GENERAL: well developed, well nourished, in no apparent distress  EXTREMITIES:   1. Integument: Normal skin temperature and turgor. Toenails x 10 are within normal limits  2.  Vascular: Dorsalis pedis two out of four bilateral and posterior tibial pulses two out of   four bilateral, capillary refill normal.   3. Musculoskeletal: Medial deviation of left fifth digit with prominent fifth metatarsal medial eminence. 4. Neurological: Normal sharp dull sensation; reflexes normal.             ASSESSMENT AND PLAN:   Diagnoses and all orders for this visit:    Adam bunion of left foot    Neuritis        Plan:     Plan:      -Patient examined, chart history reviewed. -Obtained and reviewed x-ray findings with patient--mild bunion deformity appreciated to the left foot with prominent 5th metatarsal head. -Discussed etiology of condition and various treatment options including conservative and surgical treatment options.  -Conservatively, recommend wearing supportive shoes with wide toe box and taking anti-inflammatories as needed for pain. Surgical corrections include shaving off the bunion and repositioning bone cut in an anatomic position with a screw fixation. -pt will move forward with conservative options at this time  -For the mildly discolored bilateral great toenails I recommend over-the-counter use of Kerasal  Return to clinic as needed        The patient indicates understanding of these issues and agrees to the plan.         Haja Clifford DPM

## 2024-04-21 ENCOUNTER — HOSPITAL ENCOUNTER (OUTPATIENT)
Age: 24
Discharge: HOME | End: 2024-04-21
Payer: COMMERCIAL

## 2024-04-21 VITALS
HEART RATE: 85 BPM | DIASTOLIC BLOOD PRESSURE: 75 MMHG | SYSTOLIC BLOOD PRESSURE: 128 MMHG | OXYGEN SATURATION: 98 % | RESPIRATION RATE: 18 BRPM | TEMPERATURE: 99 F

## 2024-04-21 DIAGNOSIS — H10.213 CHEMICAL CONJUNCTIVITIS OF BOTH EYES: Primary | ICD-10-CM

## 2024-04-21 NOTE — ED PROVIDER NOTES
Chief Complaint   Patient presents with    Eye Problem       History obtained from: patient, father at bedside    services not used     HPI:     Masoud Killian is a 23 year old female who presents with bilateral eye irritation since yesterday.  Patient got hand  in bilateral eyes yesterday and was seen at outside hospital ER.  Patient had eyes irrigated with Tito lens and was prescribed erythromycin ointment for chemical conjunctivitis of bilateral eyes.  Patient applied ointment last night as directed.  Patient continues to have irritation and redness to left eye and noted a small amount of yellow discharge to eyes this morning.  Denies eye pain, vision changes, bleeding, swelling or redness around the eyes, pain with eye movements, headaches, fevers.  Patient does not wear contact lenses or glasses.    PMH  History reviewed. No pertinent past medical history.    [facility] asessment screens reviewed and agree.  Nurses notes reviewed I agree with documentation.    Family History   Family history unknown: Yes     Family history reviewed with patient/caregiver and is not pertinent to presenting problem.  Social History     Socioeconomic History    Marital status: Single     Spouse name: Not on file    Number of children: Not on file    Years of education: Not on file    Highest education level: Not on file   Occupational History    Not on file   Tobacco Use    Smoking status: Never    Smokeless tobacco: Never   Vaping Use    Vaping status: Never Used   Substance and Sexual Activity    Alcohol use: Yes     Comment: occassionally    Drug use: Never    Sexual activity: Not on file   Other Topics Concern    Grew up on a farm Not Asked    History of tanning Not Asked    Outdoor occupation Not Asked    Breast feeding Not Asked    Reaction to local anesthetic No    Pt has a pacemaker No    Pt has a defibrillator No   Social History Narrative    Not on file     Social Determinants of Health      Financial Resource Strain: Not on file   Food Insecurity: Not on file   Transportation Needs: Not on file   Physical Activity: Not on file   Stress: Not on file   Social Connections: Not on file   Housing Stability: Not on file         ROS:   Positive for stated complaint: Bilateral eye irritation, left eye redness  All other systems reviewed and negative except as noted above.  Constitutional and Vital Signs Reviewed.    Physical Exam:     Findings:    /75   Pulse 85   Temp 99.1 °F (37.3 °C) (Temporal)   Resp 18   LMP 03/28/2024 (Exact Date)   SpO2 98%   GENERAL: well developed, no acute distress, non-toxic appearing   SKIN: good skin turgor, no obvious rashes  HEAD: normocephalic, atraumatic  EYES: Minimal left conjunctival injection, no discharge, no lesions to conjunctiva, lids normal, PERRLA, EOMs intact without pain, no periorbital edema or erythema or tenderness, vision grossly intact, small area of uptake to bilateral cornea on fluorescein exam, no ulcerations    OROPHARYNX: MMM, maintaining airway and secretions  NECK: no nuchal rigidity, no edema, phonation normal    CARDIO: regular rate   LUNGS: no increased WOB  EXTREMITIES: WHEAT without difficulty  NEURO: no focal deficits  PSYCH: alert and oriented x3, answering questions appropriately, mood appropriate    MDM/Assessment/Plan:   Orders for this encounter:    No orders of the defined types were placed in this encounter.      Labs performed this visit:  No results found for this or any previous visit (from the past 10 hour(s)).    Imaging performed this visit:  No orders to display       Medical Decision Making  DDx includes chemical conjunctivitis versus corneal abrasion versus bacterial conjunctivitis versus other.  Patient is overall very well-appearing with stable vitals.  No signs or symptoms of systemic illness.  No signs or symptoms of cellulitis around the eye.  No eye pain. Visual acuity 20/30 right, 20/20 left.  There is a faint  area of uptake to bilateral conjunctive on fluorescein exam using penlight.  There is no evidence of ulceration.  Recommend patient continue previously prescribed erythromycin ointment to treat chemical conjunctivitis and possible corneal abrasion.  Discussed supportive care including warm compress and OTC Tylenol/Motrin as needed for pain.  Instructed patient to go directly to nearest ER with any worsening or concerning symptoms.  Follow-up with ophthalmology.    Amount and/or Complexity of Data Reviewed  External Data Reviewed: notes.    Risk  OTC drugs.  Prescription drug management.          Diagnosis:    ICD-10-CM    1. Chemical conjunctivitis of both eyes  H10.213           All results reviewed and discussed with patient/patient's family. Patient/patient's family verbalize excellent understanding of instructions and feels comfortable with plan. All of patient's/patient's family's questions were addressed.   See AVS for detailed discharge instructions for your condition today.    Follow Up with:  Bernardo Coelho MD  [address]  [address]  [facility] [address]  [phone]    Schedule an appointment as soon as possible for a visit   Ophthalmology      Note: This document was dictated using Dragon medical dictation software.  Proofreading was performed to the best of my ability, but errors may be present.    Mari Kellogg PA-C

## 2024-04-21 NOTE — ED INITIAL ASSESSMENT (HPI)
Pt seen in ER yesterday for having hand  in bilateral eyes. Rx'd erythromycin. Pt states the drops make her eyes feel more painful and swollen.

## 2024-04-21 NOTE — DISCHARGE INSTRUCTIONS
Apply previously prescribed antibiotic ointment to eyes as directed   Apply clean, warm compress to eyes a few times daily as tolerated   Avoid touching or putting anything else in or near eyes     Follow up with opthalmology

## 2024-04-22 ENCOUNTER — TELEPHONE (OUTPATIENT)
Dept: FAMILY MEDICINE CLINIC | Facility: CLINIC | Age: 24
End: 2024-04-22

## 2024-04-22 DIAGNOSIS — H10.213 CHEMICAL CONJUNCTIVITIS OF BOTH EYES: Primary | ICD-10-CM

## 2024-04-22 NOTE — TELEPHONE ENCOUNTER
Patient is requesting referral. Patient was see in Immediate care and was advised to follow up with ophthalmology, Referral needs to be faxed over pt did not have fax number      Name of specialist and specialty department : Bernardo Coelho MD  Ophthalmology   Reason for visit with the specialist: IC follow up   Address of the specialist office: [address]  [address]  [place] [id number]  Appointment date: n/a      Phone number  [phone]    CSS informed patient the turnaround time for referral is 5-7 business days.  Patient was informed to check their [employer] account for referral status.

## 2024-04-22 NOTE — TELEPHONE ENCOUNTER
Dr. Schreiber,     Patient called requesting referral to Dr. Coelho for UC follow up visit.     Pended referral please review diagnosis and sign off if you agree.    Thank you.  Tabby Vazquez  Managed [facility]

## 2024-04-23 ENCOUNTER — TELEPHONE (OUTPATIENT)
Dept: FAMILY MEDICINE CLINIC | Facility: CLINIC | Age: 24
End: 2024-04-23

## 2024-04-23 ENCOUNTER — TELEPHONE (OUTPATIENT)
Dept: OPHTHALMOLOGY | Facility: CLINIC | Age: 24
End: 2024-04-23

## 2024-04-23 DIAGNOSIS — H10.213 CHEMICAL CONJUNCTIVITIS OF BOTH EYES: Primary | ICD-10-CM

## 2024-04-23 DIAGNOSIS — S05.00XA CORNEAL ABRASION, UNSPECIFIED LATERALITY, INITIAL ENCOUNTER: Primary | ICD-10-CM

## 2024-04-23 NOTE — TELEPHONE ENCOUNTER
Patient in process of getting referral revised to see Dr. Chisholm, patient was seen in immediate care with concern of hand  in eyes and possible cornea scratched. Please contact patient at [phone], thanks.

## 2024-04-23 NOTE — TELEPHONE ENCOUNTER
CALLED PATIENT, she was seen at the ER first, then she went to urgent care and they prescribed antibiotic ointment.  She was told to follow up with [facility] 3 days.  She is scheduled for 4/25/24

## 2024-04-23 NOTE — TELEPHONE ENCOUNTER
Dr. Schreiber,     Patient called requesting referral to Dr. Chisholm for immediate care follow up for scratched cornea and hand  in eyes.     Pended referral please review diagnosis and sign off if you agree.    Thank you.  Tabby Vazquez  [facility]

## 2024-04-23 NOTE — TELEPHONE ENCOUNTER
Patient is requesting referral.     Name of specialist and specialty department : Aamir Workman - Ophthalmology   Reason for visit with the specialist: immediate care follow up  Address of the specialist office:[address], [place], [place] - [address]  Appointment date: Pending, for as soon as possible         CSS informed patient the turnaround time for referral is 5-7 business days.  Patient was informed to check their [employer] account for referral status.

## 2024-04-23 NOTE — TELEPHONE ENCOUNTER
Patient calling about the referral discussed in the below telephone encounter. She did not know that Dr. Coelho was not part of [facility] and would like to stay with our providers to have insurance cover everything.     I referred her to Dr. Chisholm in Ophthalmology and gave her the number to call to schedule with him. Also she will need the referral updated to reflect the change in providers.     She was concerned about referral getting to Dr. Chisholm's office. I let her know that within our network the doctor should be able to pull it up in her chart. She asked if we can still fax it to Dr. Chisholm's office as well.     May need to discuss further with patient, call [phone]

## 2024-04-24 NOTE — TELEPHONE ENCOUNTER
Patient called regarding this referral. She wants to know if it needs to be faxed to  office to please fax it before her appointment on 04/25/2024.   There are no preventive care reminders to display for this patient.    Patient is up to date, no discussion needed.

## 2024-04-25 ENCOUNTER — OFFICE VISIT (OUTPATIENT)
Dept: OPHTHALMOLOGY | Facility: CLINIC | Age: 24
End: 2024-04-25

## 2024-04-25 DIAGNOSIS — H02.883 MEIBOMIAN GLAND DYSFUNCTION (MGD) OF BOTH EYES: Primary | ICD-10-CM

## 2024-04-25 DIAGNOSIS — H16.143 SPK (SUPERFICIAL PUNCTATE KERATITIS), BILATERAL: ICD-10-CM

## 2024-04-25 DIAGNOSIS — H02.886 MEIBOMIAN GLAND DYSFUNCTION (MGD) OF BOTH EYES: Primary | ICD-10-CM

## 2024-04-25 PROBLEM — Z04.9 SUSPECTED CONDITION NOT FOUND: Status: ACTIVE | Noted: 2024-04-25

## 2024-04-25 PROCEDURE — 99203 OFFICE O/P NEW LOW 30 MIN: CPT | Performed by: OPHTHALMOLOGY

## 2024-04-25 RX ORDER — ERYTHROMYCIN 5 MG/G
OINTMENT OPHTHALMIC EVERY 6 HOURS
COMMUNITY
Start: 2024-04-20 | End: 2024-04-25

## 2024-04-25 NOTE — PATIENT INSTRUCTIONS
Meibomian gland dysfunction (MGD) of both eyes  Warm compresses and artifical tears as needed    SPK (superficial punctate keratitis), bilateral  Recommend artificial tears as needed.    Discontinue Erythromycin amador.      Will see patient as needed

## 2024-04-25 NOTE — PROGRESS NOTES
Masoud Killian is a 23 year old female.    HPI:     HPI    Pt. C/O accidentally splashed hand  in both the eyes on 4/20/24 immediately rinsed both the eyes with water, eyes were turning red so went to Urgent Care within 30 minutes of the incident where they rinsed her eyes and she was told she has a chemical conjunctivitis with corneal abrasions both eyes and was prescribed Erythromycin eye ointment and was advised to use it 3 times a day, states after she started using the ointment, symptoms did improve, but still eyes feel tired and a little swollen occasionally, on blinking feels FBS (saw an eye lash in the left eye yesterday night, still bothering).   Denies any vision changes.   No H/O glasses or CL wear.     LDE 2 years ago.    Last edited by Norma Shi OT on 4/25/2024 11:30 AM.        Patient History:  History reviewed. No pertinent past medical history.    Surgical History: Masoud Killian has no past surgical history on file.    Family History   Family history unknown: Yes       Social History:   Social History     Socioeconomic History    Marital status: Single   Tobacco Use    Smoking status: Never    Smokeless tobacco: Never   Vaping Use    Vaping status: Never Used   Substance and Sexual Activity    Alcohol use: Yes     Comment: occassionally    Drug use: Never   Other Topics Concern    Reaction to local anesthetic No    Pt has a pacemaker No    Pt has a defibrillator No       Medications:  Current Outpatient Medications   Medication Sig Dispense Refill    Ciclopirox 8 % External Solution Apply 1 Application topically nightly. (Patient not taking: Reported on 11/16/2023) 6 mL 0       Allergies:  No Known Allergies    ROS:       PHYSICAL EXAM:     Base Eye Exam       Visual Acuity (Snellen - Linear)         Right Left    Dist sc 20/20 20/20    Near sc 20/20 20/20              Pupils         Pupils    Right PERRL    Left PERRL              Visual Fields         Left Right     Full Full               Extraocular Movement         Right Left     Full Full                  Slit Lamp and Fundus Exam       Slit Lamp Exam         Right Left    Lids/Lashes Meibomian gland dysfunction Meibomian gland dysfunction    Conjunctiva/Sclera Non-injected Non-injected    Cornea Trace SPK inferior Trace SPK inferior    Anterior Chamber Deep and quiet Deep and quiet    Iris Normal Normal              Fundus Exam         Right Left    Disc Good rim Good rim    C/D Ratio 0.4 0.4                     ASSESSMENT/PLAN:     Diagnoses and Plan:     Meibomian gland dysfunction (MGD) of both eyes  Warm compresses and artifical tears as needed    SPK (superficial punctate keratitis), bilateral  Recommend artificial tears as needed.    Discontinue Erythromycin amador.      Will see patient as needed      No orders of the defined types were placed in this encounter.      Meds This Visit:  Requested Prescriptions      No prescriptions requested or ordered in this encounter        Follow up instructions:  Return if symptoms worsen or fail to improve.    4/25/2024  Scribed by: Aamir Chisholm MD

## 2024-04-25 NOTE — ASSESSMENT & PLAN NOTE
Recommend artificial tears as needed.    Discontinue Erythromycin amador.      Will see patient as needed

## 2024-04-26 ENCOUNTER — OFFICE VISIT (OUTPATIENT)
Dept: FAMILY MEDICINE CLINIC | Facility: CLINIC | Age: 24
End: 2024-04-26

## 2024-04-26 VITALS
TEMPERATURE: 98 F | SYSTOLIC BLOOD PRESSURE: 129 MMHG | BODY MASS INDEX: 24.42 KG/M2 | HEART RATE: 108 BPM | WEIGHT: 131 LBS | HEIGHT: 61.5 IN | DIASTOLIC BLOOD PRESSURE: 85 MMHG

## 2024-04-26 DIAGNOSIS — H65.93 FLUID LEVEL BEHIND TYMPANIC MEMBRANE OF BOTH EARS: Primary | ICD-10-CM

## 2024-04-26 PROCEDURE — 96127 BRIEF EMOTIONAL/BEHAV ASSMT: CPT | Performed by: NURSE PRACTITIONER

## 2024-04-26 PROCEDURE — 99213 OFFICE O/P EST LOW 20 MIN: CPT | Performed by: NURSE PRACTITIONER

## 2024-04-26 PROCEDURE — 3008F BODY MASS INDEX DOCD: CPT | Performed by: NURSE PRACTITIONER

## 2024-04-26 PROCEDURE — 3079F DIAST BP 80-89 MM HG: CPT | Performed by: NURSE PRACTITIONER

## 2024-04-26 PROCEDURE — 3074F SYST BP LT 130 MM HG: CPT | Performed by: NURSE PRACTITIONER

## 2024-04-26 NOTE — PROGRESS NOTES
HPI    Patient presents for concerns of left ear pain.  With congestion in ear, as well.  Also with some headaches, chills and body aches for the past few days.      Review of Systems   Constitutional:  Positive for chills.   HENT:  Positive for ear pain.         Left ear congestion.    Neurological:  Positive for headaches.        Vitals:    04/26/24 1412 04/26/24 1427   BP: 129/85    Pulse: 108    Temp:  98.2 °F (36.8 °C)   Weight: 131 lb (59.4 kg)    Height: 5' 1.5\" (1.562 m)      Body mass index is 24.35 kg/m².    Health Maintenance   Topic Date Due    Pap Smear  Never done    COVID-19 Vaccine (3 - 2023-24 season) 09/01/2023    Annual Depression Screening  01/01/2024    Annual Physical  05/23/2024    Influenza Vaccine (Season Ended) 10/01/2024    DTaP,Tdap,and Td Vaccines (8 - Td or Tdap) 10/20/2031    Pneumococcal Vaccine: Birth to 64yrs  Completed    HPV Vaccines  Completed       Patient's last menstrual period was 03/28/2024 (exact date).    History reviewed. No pertinent past medical history.    .History reviewed. No pertinent surgical history.    Family History   Family history unknown: Yes       Social History     Socioeconomic History    Marital status: Single     Spouse name: Not on file    Number of children: Not on file    Years of education: Not on file    Highest education level: Not on file   Occupational History    Not on file   Tobacco Use    Smoking status: Never    Smokeless tobacco: Never   Vaping Use    Vaping status: Never Used   Substance and Sexual Activity    Alcohol use: Yes     Comment: occassionally    Drug use: Never    Sexual activity: Not on file   Other Topics Concern    Grew up on a farm Not Asked    History of tanning Not Asked    Outdoor occupation Not Asked    Breast feeding Not Asked    Reaction to local anesthetic No    Pt has a pacemaker No    Pt has a defibrillator No   Social History Narrative    Not on file     Social Determinants of Health     Financial Resource Strain:  Not on file   Food Insecurity: Not on file   Transportation Needs: Not on file   Physical Activity: Not on file   Stress: Not on file   Social Connections: Not on file   Housing Stability: Not on file       Current Outpatient Medications   Medication Sig Dispense Refill    Ciclopirox 8 % External Solution Apply 1 Application topically nightly. (Patient not taking: Reported on 11/16/2023) 6 mL 0       Allergies:  No Known Allergies    Physical Exam  Vitals and nursing note reviewed.   Constitutional:       General: She is not in acute distress.     Appearance: Normal appearance. She is not ill-appearing.   HENT:      Right Ear: Tympanic membrane, ear canal and external ear normal. There is no impacted cerumen.      Left Ear: Tympanic membrane, ear canal and external ear normal. There is no impacted cerumen.      Nose:      Right Sinus: No maxillary sinus tenderness or frontal sinus tenderness.      Left Sinus: No maxillary sinus tenderness or frontal sinus tenderness.      Mouth/Throat:      Mouth: Mucous membranes are moist.      Pharynx: Oropharynx is clear. No oropharyngeal exudate or posterior oropharyngeal erythema.   Cardiovascular:      Rate and Rhythm: Normal rate and regular rhythm.      Heart sounds: Normal heart sounds.   Pulmonary:      Effort: Pulmonary effort is normal. No respiratory distress.      Breath sounds: Normal breath sounds. No stridor. No wheezing, rhonchi or rales.   Chest:      Chest wall: No tenderness.   Neurological:      Mental Status: She is alert and oriented to person, place, and time.          Assessment and Plan:  Problem List Items Addressed This Visit    None  Visit Diagnoses       Fluid level behind tympanic membrane of both ears    -  Primary           To take sudafed and otc allergy relief as needed.  If worsening of symptoms or s/s of infection, follow up.       Discussed plan of care with patient and patient is in agreement.  All questions answered. Patient to call with  questions or concerns.    Encouraged to sign up for My Chart if not already registered.

## 2024-05-06 ENCOUNTER — NURSE TRIAGE (OUTPATIENT)
Dept: FAMILY MEDICINE CLINIC | Facility: CLINIC | Age: 24
End: 2024-05-06

## 2024-05-06 ENCOUNTER — LAB ENCOUNTER (OUTPATIENT)
Dept: LAB | Age: 24
End: 2024-05-06
Payer: COMMERCIAL

## 2024-05-06 DIAGNOSIS — F41.9 ANXIETY: ICD-10-CM

## 2024-05-06 DIAGNOSIS — R00.2 PALPITATIONS: ICD-10-CM

## 2024-05-06 PROCEDURE — 93010 ELECTROCARDIOGRAM REPORT: CPT | Performed by: INTERNAL MEDICINE

## 2024-05-06 PROCEDURE — 93005 ELECTROCARDIOGRAM TRACING: CPT

## 2024-05-06 NOTE — TELEPHONE ENCOUNTER
Action Requested: Summary for Provider     []  Critical Lab, Recommendations Needed  [] Need Additional Advice  []   FYI    []   Need Orders  [] Need Medications Sent to Pharmacy  []  Other     SUMMARY: Per protocol advised : Office visit   Future Appointments   Date Time Provider Department Center   2024  3:40 PM Rahul Fowler APRN [facility]       Reason for call: Anxiety  Onset: Data Unavailable  Patient calling ( name and  of patient verified ) states has been feeling anxious for the past 2 weeks and having slight chest discomfort with the anxiety     Care Advice    Patient/Caregiver understands and will follow care advice?: Yes, plans to follow advice           [facility]xiety [facility]ic [facility]  Delia Elise RN Mon May 06, 2024 09:00 AM      Care Advice       SEE IN OFFICE WITHIN 3 DAYS:   * You need to be examined.   * Let me give you an appointment.    AVOID CAFFEINE:  * Avoid caffeine-containing beverages. Reason: It is a stimulant and can aggravate anxiety.  * Examples include coffee, tea, cachorro.    CALL BACK IF:  * Anxiety or panic attacks continue  * You feel like harming yourself  * You become worse                           Patient verbalizes understanding and agrees with plan.         Reason for Disposition   Symptoms of anxiety or panic and has not been evaluated for this by physician    Protocols used: [facility]xiety and Panic [facility]

## 2024-05-07 ENCOUNTER — LAB ENCOUNTER (OUTPATIENT)
Dept: LAB | Age: 24
End: 2024-05-07
Payer: COMMERCIAL

## 2024-05-07 LAB
ATRIAL RATE: 72 BPM
BASOPHILS # BLD AUTO: 0.03 X10(3) UL (ref 0–0.2)
BASOPHILS NFR BLD AUTO: 0.5 %
DEPRECATED RDW RBC AUTO: 44.6 FL (ref 35.1–46.3)
EOSINOPHIL # BLD AUTO: 0.08 X10(3) UL (ref 0–0.7)
EOSINOPHIL NFR BLD AUTO: 1.3 %
ERYTHROCYTE [DISTWIDTH] IN BLOOD BY AUTOMATED COUNT: 13 % (ref 11–15)
HCT VFR BLD AUTO: 40.5 %
HGB BLD-MCNC: 13.1 G/DL
IMM GRANULOCYTES # BLD AUTO: 0.01 X10(3) UL (ref 0–1)
IMM GRANULOCYTES NFR BLD: 0.2 %
LYMPHOCYTES # BLD AUTO: 3.36 X10(3) UL (ref 1–4)
LYMPHOCYTES NFR BLD AUTO: 54.4 %
MCH RBC QN AUTO: 30.2 PG (ref 26–34)
MCHC RBC AUTO-ENTMCNC: 32.3 G/DL (ref 31–37)
MCV RBC AUTO: 93.3 FL
MONOCYTES # BLD AUTO: 0.49 X10(3) UL (ref 0.1–1)
MONOCYTES NFR BLD AUTO: 7.9 %
NEUTROPHILS # BLD AUTO: 2.21 X10 (3) UL (ref 1.5–7.7)
NEUTROPHILS # BLD AUTO: 2.21 X10(3) UL (ref 1.5–7.7)
NEUTROPHILS NFR BLD AUTO: 35.7 %
P AXIS: 41 DEGREES
P-R INTERVAL: 102 MS
PLATELET # BLD AUTO: 345 10(3)UL (ref 150–450)
Q-T INTERVAL: 418 MS
QRS DURATION: 86 MS
QTC CALCULATION (BEZET): 457 MS
R AXIS: 66 DEGREES
RBC # BLD AUTO: 4.34 X10(6)UL
T AXIS: 12 DEGREES
TSI SER-ACNC: 2.27 MIU/ML (ref 0.55–4.78)
VENTRICULAR RATE: 72 BPM
VIT D+METAB SERPL-MCNC: 32.3 NG/ML (ref 30–100)
WBC # BLD AUTO: 6.2 X10(3) UL (ref 4–11)

## 2024-05-07 PROCEDURE — 82306 VITAMIN D 25 HYDROXY: CPT

## 2024-05-07 PROCEDURE — 85025 COMPLETE CBC W/AUTO DIFF WBC: CPT

## 2024-05-07 PROCEDURE — 84443 ASSAY THYROID STIM HORMONE: CPT

## 2024-05-07 PROCEDURE — 36415 COLL VENOUS BLD VENIPUNCTURE: CPT

## 2024-05-08 ENCOUNTER — PATIENT MESSAGE (OUTPATIENT)
Dept: FAMILY MEDICINE CLINIC | Facility: CLINIC | Age: 24
End: 2024-05-08

## 2024-05-09 NOTE — TELEPHONE ENCOUNTER
From: Masoud Killian  To: Rahul Fowler  Sent: 5/8/2024 2:34 PM CDT  Subject: EKG 12 - LEAD     Hi Dr. Fowler, thank you for confirming my test results! I had only one question around the EKG results, specifically this: Sinus rhythm with sinus arrhythmia with short DC. Is this due to the anxiety/ fast heart palpitation? Or is that just normal?     Also, the Atarax has been working well and I’ve felt more relaxed.     Thanks!  Masoud

## 2024-05-10 ENCOUNTER — TELEPHONE (OUTPATIENT)
Dept: FAMILY MEDICINE CLINIC | Facility: CLINIC | Age: 24
End: 2024-05-10

## 2024-07-30 ENCOUNTER — PATIENT OUTREACH (OUTPATIENT)
Dept: FAMILY MEDICINE CLINIC | Facility: CLINIC | Age: 24
End: 2024-07-30

## 2024-09-08 ENCOUNTER — HOSPITAL ENCOUNTER (OUTPATIENT)
Age: 24
Discharge: HOME | End: 2024-09-08
Payer: COMMERCIAL

## 2024-09-08 VITALS
SYSTOLIC BLOOD PRESSURE: 140 MMHG | OXYGEN SATURATION: 100 % | TEMPERATURE: 99 F | RESPIRATION RATE: 18 BRPM | HEART RATE: 84 BPM | DIASTOLIC BLOOD PRESSURE: 89 MMHG

## 2024-09-08 DIAGNOSIS — N76.0 ACUTE VAGINITIS: ICD-10-CM

## 2024-09-08 DIAGNOSIS — A09 TRAVELER'S DIARRHEA: Primary | ICD-10-CM

## 2024-09-08 LAB
B-HCG UR QL: NEGATIVE
BILIRUB UR QL STRIP: NEGATIVE
CLARITY UR: CLEAR
COLOR UR: YELLOW
GLUCOSE UR STRIP-MCNC: NEGATIVE MG/DL
HGB UR QL STRIP: NEGATIVE
KETONES UR STRIP-MCNC: NEGATIVE MG/DL
LEUKOCYTE ESTERASE UR QL STRIP: NEGATIVE
NITRITE UR QL STRIP: NEGATIVE
PH UR STRIP: 7 [PH]
PROT UR STRIP-MCNC: NEGATIVE MG/DL
SP GR UR STRIP: 1.02
UROBILINOGEN UR STRIP-ACNC: <2 MG/DL

## 2024-09-08 PROCEDURE — 99213 OFFICE O/P EST LOW 20 MIN: CPT | Performed by: NURSE PRACTITIONER

## 2024-09-08 PROCEDURE — 81002 URINALYSIS NONAUTO W/O SCOPE: CPT | Performed by: NURSE PRACTITIONER

## 2024-09-08 PROCEDURE — 81025 URINE PREGNANCY TEST: CPT | Performed by: NURSE PRACTITIONER

## 2024-09-08 NOTE — DISCHARGE INSTRUCTIONS
Imodium as discussed  Push fluids  SID diet for now then slowly advance.  (Bananas, rice, applesauce, toast, tea)  If you develop abdominal pain, vomiting that will not stop, or worsening symptoms, please go to the emergency room  We will contact you with the results of your vaginosis panel is it becomes available   Follow up with your doctor in 2 days if no improvement

## 2024-09-08 NOTE — ED PROVIDER NOTES
Chief Complaint   Patient presents with    Urinary Symptoms       HPI:     Masoud is a 24 year old female who presents with a chief complaint of generalized abdominal pain, and diarrhea, ongoing for 3 days.  Started after she returned from [facility].  She denies fever.  She denies nausea, or vomiting.  She denies dysuria.  She does report prior to going to [facility], and during her trip in [facility], she was having white thick vaginal discharge, has been using a antifungal suppository, intravaginally for the past 3 days, which seems to be helping.  She is urinating normally.  She is eating and drinking normally.  No STI concerns. LMP 8/22/24    [facility]: PFSH asessment screens reviewed and agree.  Nurses notes reviewed I agree with documentation.    Family History   Family history unknown: Yes     Family history reviewed with patient/caregiver and is not pertinent to presenting problem.  Social History     Socioeconomic History    Marital status: Single     Spouse name: Not on file    Number of children: Not on file    Years of education: Not on file    Highest education level: Not on file   Occupational History    Not on file   Tobacco Use    Smoking status: Never     Passive exposure: Never    Smokeless tobacco: Never   Vaping Use    Vaping status: Never Used   Substance and Sexual Activity    Alcohol use: Yes     Comment: occassionally    Drug use: Never    Sexual activity: Not on file   Other Topics Concern    Grew up on a farm Not Asked    History of tanning Not Asked    Outdoor occupation Not Asked    Breast feeding Not Asked    Reaction to local anesthetic No    Pt has a pacemaker No    Pt has a defibrillator No   Social History Narrative    Not on file     Social Determinants of Health     Financial Resource Strain: Not on file   Food Insecurity: Not on file   Transportation Needs: Not on file   Physical Activity: Not on file   Stress: Not on file   Social Connections: Not on file   Housing Stability: Not on file        ROS:   Positive for stated complaint: diarrhea, abdominal pain  All other systems reviewed and negative except as noted above.  Constitutional and Vital Signs Reviewed.      Physical Exam:     /89   Pulse 84   Temp 98.5 °F (36.9 °C) (Temporal)   Resp 18   LMP 08/22/2024 (Exact Date)   SpO2 100%   GENERAL: well developed, well nourished, well hydrated, no distress  EYES: sclera non icteric bilateral  HEENT: atraumatic, normocephalic, ears, nose and throat are clear  NECK: supple, no adenopathy  LUNGS: clear to auscultation, no RRW  CARDIO: RRR without murmur  GI: soft, non-tender, without masses or organomegaly  : chaperone offered and declined. Small white thick discharge noted. No CMT or adnexal tenderness bilaterally.   EXTREMITIES: no cyanosis or edema. WHEAT without difficulty  SKIN: good skin turgor, no obvious rashes  MDM/Assessment/Plan:   Orders for this encounter:    Orders Placed This Encounter    POCT Urinalysis Dipstick    POCT Pregnancy, Urine    Vaginitis Vaginosis PCR Panel     Order Specific Question:   Release to patient     Answer:   Immediate    POCT Urine Dip    POCT Pregnancy, Urine       Labs performed this visit:  Recent Results (from the past 10 hour(s))   POCT Urinalysis Dipstick    Collection Time: 09/08/24  9:02 AM   Result Value Ref Range    Urine Color Yellow Yellow    Urine Clarity Clear Clear    Specific Gravity, Urine 1.020 1.005 - 1.030    PH, Urine 7.0 5.0 - 8.0    Protein urine Negative Negative mg/dL    Glucose, Urine Negative Negative mg/dL    Ketone, Urine Negative Negative mg/dL    Bilirubin, Urine Negative Negative    Blood, Urine Negative Negative    Nitrite Urine Negative Negative    Urobilinogen urine <2.0 <2.0 mg/dL    Leukocyte esterase urine Negative Negative   POCT Pregnancy, Urine    Collection Time: 09/08/24  9:05 AM   Result Value Ref Range    POCT Urine Pregnancy Negative Negative        MDM:  Medical Decision Making  Differentials considered: Acute  vaginitis versus UTI versus traveler's diarrhea versus acute abdomen versus other    HPI and exam most consistent with traveler's diarrhea along with acute vaginitis.  Vaginitis symptoms seem to be improving as patient on medication for the past 3 days from [facility].  Advised she continue to use this.  Patient has no abdominal tenderness on exam, low suspicion for acute abdomen.  Urine dip is normal today.  Discussed supportive care.  Discussed ER precautions.  Advised close follow-up with primary care provider.  Patient verbalized understanding and agreeable to plan of care.    Amount and/or Complexity of Data Reviewed  Labs: ordered. Decision-making details documented in ED Course.     Details: Urine dip normal  Urine pregnancy negative  Vaginosis panel pending     Risk  OTC drugs.          Diagnosis:    ICD-10-CM    1. Traveler's diarrhea  A09       2. Acute vaginitis  N76.0 Vaginitis Vaginosis PCR Panel     Vaginitis Vaginosis PCR Panel          All results reviewed and discussed with patient.  See AVS for detailed discharge instructions for your condition today.    Follow Up with:  No follow-up provider specified.

## 2024-09-08 NOTE — ED INITIAL ASSESSMENT (HPI)
Pt presents with occasional burning with urination x 1 week. Pt reports recently noticed white vaginal discharge 4 days ago. Discharge has no odor.     Pt used vaginal insert with some relief.     Vaginal Insert was from [place] - contained Metronidazole, Nystantin and Hydrocortisone. Has used this for 3 days.

## 2024-09-09 LAB
BV BACTERIA DNA VAG QL NAA+PROBE: NEGATIVE
C GLABRATA DNA VAG QL NAA+PROBE: NEGATIVE
C KRUSEI DNA VAG QL NAA+PROBE: NEGATIVE
CANDIDA DNA VAG QL NAA+PROBE: NEGATIVE
T VAGINALIS DNA VAG QL NAA+PROBE: NEGATIVE

## 2024-09-11 ENCOUNTER — OFFICE VISIT (OUTPATIENT)
Dept: FAMILY MEDICINE CLINIC | Facility: CLINIC | Age: 24
End: 2024-09-11

## 2024-09-11 VITALS
HEART RATE: 141 BPM | DIASTOLIC BLOOD PRESSURE: 79 MMHG | SYSTOLIC BLOOD PRESSURE: 128 MMHG | WEIGHT: 125.81 LBS | BODY MASS INDEX: 23.45 KG/M2 | HEIGHT: 61.5 IN

## 2024-09-11 DIAGNOSIS — R30.0 DYSURIA: Primary | ICD-10-CM

## 2024-09-11 PROCEDURE — 99213 OFFICE O/P EST LOW 20 MIN: CPT | Performed by: FAMILY MEDICINE

## 2024-09-11 PROCEDURE — 3074F SYST BP LT 130 MM HG: CPT | Performed by: FAMILY MEDICINE

## 2024-09-11 PROCEDURE — 3008F BODY MASS INDEX DOCD: CPT | Performed by: FAMILY MEDICINE

## 2024-09-11 PROCEDURE — 3078F DIAST BP <80 MM HG: CPT | Performed by: FAMILY MEDICINE

## 2024-09-11 RX ORDER — FLUCONAZOLE 150 MG/1
150 TABLET ORAL ONCE
Qty: 1 TABLET | Refills: 0 | Status: SHIPPED | OUTPATIENT
Start: 2024-09-11 | End: 2024-09-11

## 2024-09-11 RX ORDER — PHENAZOPYRIDINE HYDROCHLORIDE 200 MG/1
200 TABLET, FILM COATED ORAL 3 TIMES DAILY PRN
Qty: 9 TABLET | Refills: 0 | Status: SHIPPED | OUTPATIENT
Start: 2024-09-11

## 2024-09-11 NOTE — PROGRESS NOTES
Masoud Killian is a 24 year old female.   Chief Complaint   Patient presents with    Urgent Care F/u     9/8/24, Negative UTI and yeast infection, burning with urination x 3 weeks     HPI:   Been few weeks - 8/21. Started once but improved. Then went to [place]  - would have once a day or every few days. Went to beach and had white discharge. Took otc vaginal inserts while in [facility].  - combo of nystatin, metronidazole and steroid.   Was seen in UC 9/8/24 - testing was all negative. UA normal. Mostly when holding her urine and doing kegals. Was fine for few days and then returned again.   Also some cramping.   Current Outpatient Medications on File Prior to Visit   Medication Sig Dispense Refill    hydrOXYzine 25 MG Oral Tab Take 1 tablet (25 mg total) by mouth every 8 (eight) hours as needed. 30 tablet 0    Ciclopirox 8 % External Solution Apply 1 Application topically nightly. (Patient not taking: Reported on 11/16/2023) 6 mL 0     No current facility-administered medications on file prior to visit.      No past medical history on file.   Social History:  Social History     Socioeconomic History    Marital status: Single   Tobacco Use    Smoking status: Never     Passive exposure: Never    Smokeless tobacco: Never   Vaping Use    Vaping status: Never Used   Substance and Sexual Activity    Alcohol use: Yes     Comment: occassionally    Drug use: Never   Other Topics Concern    Reaction to local anesthetic No    Pt has a pacemaker No    Pt has a defibrillator No        REVIEW OF SYSTEMS:   Review of Systems   See HPI     EXAM:   /79   Pulse (!) 141   Ht 5' 1.5\" (1.562 m)   Wt 125 lb 12.8 oz (57.1 kg)   LMP 08/22/2024 (Exact Date)   BMI 23.38 kg/m²   GENERAL: well developed, well nourished,in no apparent distress  : normal external exam - mild erythema and tenderness to touch just outside vagina on mucosa     ASSESSMENT AND PLAN:   1. Dysuria  Tx with oral diflucan and pyridium. Increase fluids. Reviewed  all tests already done and normal. Irritation on mucosa is causing the pain.       The patient indicates understanding of these issues and agrees to the plan.      Hanna Schreiber MD  9/11/2024  10:11 AM

## 2024-09-16 ENCOUNTER — PATIENT MESSAGE (OUTPATIENT)
Dept: FAMILY MEDICINE CLINIC | Facility: CLINIC | Age: 24
End: 2024-09-16

## 2024-09-17 NOTE — TELEPHONE ENCOUNTER
From: Masoud Killian  To: Hanna Schreiber  Sent: 9/16/2024 11:32 AM CDT  Subject: Follow Up Examination Re: Burning     Hi Dr. Schreiber, I hope you had a peterson weekend.     I wanted to [place] back here after taking the Phenazopyridine to share an update on how I have been feeling.    The medicine seemed to have helped with inflammation, but I’m unfortunately still experiencing a burning sensation internally, (sometimes when I don’t even have to use the bathroom). The feeling comes and goes but I would still like to see what’s going on.    I am currently on my menstrual cycle, so not entirely sure if it would be possible/best to come in this week, but would feel better doing a reexamination to see what’s going on, even if this occurs next week.    Please let me know what would be possible.    Thanks!  Masoud

## 2024-09-20 ENCOUNTER — OFFICE VISIT (OUTPATIENT)
Dept: FAMILY MEDICINE CLINIC | Facility: CLINIC | Age: 24
End: 2024-09-20

## 2024-09-20 ENCOUNTER — NURSE TRIAGE (OUTPATIENT)
Dept: FAMILY MEDICINE CLINIC | Facility: CLINIC | Age: 24
End: 2024-09-20

## 2024-09-20 VITALS
DIASTOLIC BLOOD PRESSURE: 73 MMHG | SYSTOLIC BLOOD PRESSURE: 121 MMHG | TEMPERATURE: 97 F | BODY MASS INDEX: 23 KG/M2 | HEART RATE: 102 BPM | WEIGHT: 124.5 LBS

## 2024-09-20 DIAGNOSIS — J02.9 SORE THROAT: ICD-10-CM

## 2024-09-20 DIAGNOSIS — B34.9 VIRAL SYNDROME: Primary | ICD-10-CM

## 2024-09-20 LAB
CONTROL LINE PRESENT WITH A CLEAR BACKGROUND (YES/NO): YES YES/NO
KIT LOT #: NORMAL NUMERIC
STREP GRP A CUL-SCR: NEGATIVE

## 2024-09-20 PROCEDURE — 3078F DIAST BP <80 MM HG: CPT | Performed by: NURSE PRACTITIONER

## 2024-09-20 PROCEDURE — 99214 OFFICE O/P EST MOD 30 MIN: CPT | Performed by: NURSE PRACTITIONER

## 2024-09-20 PROCEDURE — 87880 STREP A ASSAY W/OPTIC: CPT | Performed by: NURSE PRACTITIONER

## 2024-09-20 PROCEDURE — 3074F SYST BP LT 130 MM HG: CPT | Performed by: NURSE PRACTITIONER

## 2024-09-20 NOTE — PROGRESS NOTES
HPI    Patient presents for sore throat and chills for 2 days.      Review of Systems   Constitutional:  Positive for chills. Negative for fever.   HENT:  Positive for sore throat.         Vitals:    09/20/24 0859   BP: 121/73   Pulse: 102   Temp: 97.1 °F (36.2 °C)   TempSrc: Tympanic   Weight: 124 lb 8 oz (56.5 kg)     Body mass index is 23.14 kg/m².    Health Maintenance   Topic Date Due    Pap Smear  Never done    Annual Physical  05/23/2024    COVID-19 Vaccine (3 - 2023-24 season) 09/01/2024    Influenza Vaccine (1) 10/01/2024    DTaP,Tdap,and Td Vaccines (8 - Td or Tdap) 10/20/2031    Annual Depression Screening  Completed    Pneumococcal Vaccine: Birth to 64yrs  Completed    HPV Vaccines  Completed       Patient's last menstrual period was 09/14/2024.    History reviewed. No pertinent past medical history.    .History reviewed. No pertinent surgical history.    Family History   Family history unknown: Yes       Social History     Socioeconomic History    Marital status: Single     Spouse name: Not on file    Number of children: Not on file    Years of education: Not on file    Highest education level: Not on file   Occupational History    Not on file   Tobacco Use    Smoking status: Never     Passive exposure: Never    Smokeless tobacco: Never   Vaping Use    Vaping status: Never Used   Substance and Sexual Activity    Alcohol use: Yes     Comment: occassionally    Drug use: Never    Sexual activity: Not on file   Other Topics Concern    Grew up on a farm Not Asked    History of tanning Not Asked    Outdoor occupation Not Asked    Breast feeding Not Asked    Reaction to local anesthetic No    Pt has a pacemaker No    Pt has a defibrillator No   Social History Narrative    Not on file     Social Determinants of Health     Financial Resource Strain: Not on file   Food Insecurity: Not on file   Transportation Needs: Not on file   Physical Activity: Not on file   Stress: Not on file   Social Connections: Not on  file   Housing Stability: Not on file       Current Outpatient Medications   Medication Sig Dispense Refill    hydrOXYzine 25 MG Oral Tab Take 1 tablet (25 mg total) by mouth every 8 (eight) hours as needed. 30 tablet 0       Allergies:  No Known Allergies    Physical Exam  Vitals and nursing note reviewed.   Constitutional:       General: She is not in acute distress.     Appearance: Normal appearance.   HENT:      Head: Normocephalic and atraumatic.      Right Ear: Tympanic membrane, ear canal and external ear normal. There is no impacted cerumen.      Left Ear: Tympanic membrane, ear canal and external ear normal. There is no impacted cerumen.      Nose: Nose normal. No congestion.      Mouth/Throat:      Mouth: Mucous membranes are moist.      Pharynx: Oropharynx is clear. Posterior oropharyngeal erythema present. No oropharyngeal exudate.   Cardiovascular:      Rate and Rhythm: Normal rate and regular rhythm.      Heart sounds: Normal heart sounds.   Pulmonary:      Effort: Pulmonary effort is normal. No respiratory distress.      Breath sounds: Normal breath sounds. No stridor. No wheezing, rhonchi or rales.   Chest:      Chest wall: No tenderness.   Neurological:      Mental Status: She is alert and oriented to person, place, and time.   Psychiatric:         Mood and Affect: Mood normal.         Behavior: Behavior normal.         Thought Content: Thought content normal.         Judgment: Judgment normal.          Assessment and Plan:  Problem List Items Addressed This Visit    None  Visit Diagnoses       Viral syndrome    -  Primary    Relevant Orders    SARS-CoV-2/Flu A and B/RSV by PCR (Ryan) [E] *Collect in Office!    Sore throat        Relevant Orders    POC Rapid Strep [59065] (Completed)           Rapid strep negative.  Swab collected for COVID, flu, RSV.  Supportive care discussed.  Follow-up as needed.     Discussed plan of care with patient and patient is in agreement.  All questions answered.  Patient to call with questions or concerns.    Encouraged to sign up for My Chart if not already registered.

## 2024-09-20 NOTE — TELEPHONE ENCOUNTER
Action Requested: Summary for Provider     []  Critical Lab, Recommendations Needed  [] Need Additional Advice  [x]   FYI    []   Need Orders  [] Need Medications Sent to Pharmacy  []  Other     SUMMARY: Visit today with Deann DELCID     Reason for call: Acute  Onset: Today     Patient calling office, transferred to Nurse Triage from    Sore throat.   Triaged and advised care advice   Denies shortness of breath, difficulty breathing, chest pain, chest pressure.     Evaluation advised today ,   Instructed to enter the building wearing a well-fitted mask and keep the mask on for their entire visit.   Instructed to practice Social distancing and hand hygiene while at the office.         Advised to monitor symptoms.  RN advised if symptoms get severely worse, patient should seek care at Emergency Room or Immediate Care.  RN also informed patient to seek immediate medical attention at ER if patient experiences severe/worsening symptoms, shortness of breath, chest pain, or severe pain.  Patient verbalizes understanding and is agreeable to instructions.           Future Appointments   Date Time Provider Department Center   9/20/2024  9:00 AM Deann Sanchez APRN [facility]   9/23/2024  1:00 PM Hanna Schreiber MD [facility]   10/21/2024  2:45 PM Hanna Schreiber MD [facility]         Reason for Disposition   Patient wants to be seen    Protocols used: Sore Throat-A-[place]

## 2024-09-21 LAB
FLUAV + FLUBV RNA SPEC NAA+PROBE: NOT DETECTED
FLUAV + FLUBV RNA SPEC NAA+PROBE: NOT DETECTED
RSV RNA SPEC NAA+PROBE: NOT DETECTED
SARS-COV-2 RNA RESP QL NAA+PROBE: NOT DETECTED

## 2024-09-23 ENCOUNTER — OFFICE VISIT (OUTPATIENT)
Dept: FAMILY MEDICINE CLINIC | Facility: CLINIC | Age: 24
End: 2024-09-23

## 2024-09-23 VITALS
DIASTOLIC BLOOD PRESSURE: 90 MMHG | HEART RATE: 114 BPM | HEIGHT: 61.5 IN | BODY MASS INDEX: 23.82 KG/M2 | WEIGHT: 127.81 LBS | SYSTOLIC BLOOD PRESSURE: 137 MMHG

## 2024-09-23 DIAGNOSIS — J34.89 SINUS PRESSURE: Primary | ICD-10-CM

## 2024-09-23 DIAGNOSIS — N89.8 VAGINAL IRRITATION: ICD-10-CM

## 2024-09-23 PROCEDURE — 3008F BODY MASS INDEX DOCD: CPT | Performed by: FAMILY MEDICINE

## 2024-09-23 PROCEDURE — 3075F SYST BP GE 130 - 139MM HG: CPT | Performed by: FAMILY MEDICINE

## 2024-09-23 PROCEDURE — 99214 OFFICE O/P EST MOD 30 MIN: CPT | Performed by: FAMILY MEDICINE

## 2024-09-23 PROCEDURE — 3080F DIAST BP >= 90 MM HG: CPT | Performed by: FAMILY MEDICINE

## 2024-09-23 NOTE — PROGRESS NOTES
Masoud Killian is a 24 year old female.   Chief Complaint   Patient presents with    Vaginal Problem    Upper Respiratory Infection     Ear pain, sinus pressure, body aches x 4 days     HPI:   Reports continued symptoms and getting worse yesterday. Body aches. Having sinus headaches. Dayquil and ibuprofen does help. Pressure in cheeks and forehead. Neck nodes are swollen. Pressure in ears also.     Reports no burning in a few days. While on menses, was burning even without urination.   Current Outpatient Medications on File Prior to Visit   Medication Sig Dispense Refill    hydrOXYzine 25 MG Oral Tab Take 1 tablet (25 mg total) by mouth every 8 (eight) hours as needed. 30 tablet 0     No current facility-administered medications on file prior to visit.      No past medical history on file.   Social History:  Social History     Socioeconomic History    Marital status: Single   Tobacco Use    Smoking status: Never     Passive exposure: Never    Smokeless tobacco: Never   Vaping Use    Vaping status: Never Used   Substance and Sexual Activity    Alcohol use: Yes     Comment: occassionally    Drug use: Never   Other Topics Concern    Reaction to local anesthetic No    Pt has a pacemaker No    Pt has a defibrillator No        REVIEW OF SYSTEMS:   Review of Systems   See HPI     EXAM:   /90   Pulse 114   Ht 5' 1.5\" (1.562 m)   Wt 127 lb 12.8 oz (58 kg)   LMP 09/14/2024   BMI 23.76 kg/m²   GENERAL: well developed, well nourished,in no apparent distress  SKIN: no rashes,no suspicious lesions  HEENT: atraumatic, normocephalic,ears and throat are clear  Right turbinate hypertrophy. Maxillary sinus tenderness.   Right adenopathy   LUNGS: clear to auscultation  CARDIO: RRR without murmur  : normal external genitalia     ASSESSMENT AND PLAN:   1. Sinus pressure  Trying to avoid antibiotics since may cause vaginal irritation. Recommend fluids, otc susanne med sinus cleanse with bottled water. Can call me in few days if  not improving and will give antibiotics.     2. Vaginal irritation  Improved.       The patient indicates understanding of these issues and agrees to the plan.      Hanna Schreiber MD  9/23/2024  1:07 PM

## 2024-09-24 ENCOUNTER — PATIENT MESSAGE (OUTPATIENT)
Dept: FAMILY MEDICINE CLINIC | Facility: CLINIC | Age: 24
End: 2024-09-24

## 2024-09-25 RX ORDER — FLUCONAZOLE 150 MG/1
150 TABLET ORAL ONCE
Qty: 2 TABLET | Refills: 0 | Status: SHIPPED | OUTPATIENT
Start: 2024-09-25 | End: 2024-09-25

## 2024-09-25 NOTE — TELEPHONE ENCOUNTER
Dr. Schreiber --- Last visit 9/23/24, trial without antibiotics.     But see pt's update, MyChart Message   Please also  address question about Claritin and hydroxyzine

## 2024-10-21 ENCOUNTER — OFFICE VISIT (OUTPATIENT)
Dept: FAMILY MEDICINE CLINIC | Facility: CLINIC | Age: 24
End: 2024-10-21

## 2024-10-21 VITALS
HEART RATE: 78 BPM | DIASTOLIC BLOOD PRESSURE: 79 MMHG | WEIGHT: 128.19 LBS | HEIGHT: 61.5 IN | BODY MASS INDEX: 23.89 KG/M2 | SYSTOLIC BLOOD PRESSURE: 131 MMHG

## 2024-10-21 DIAGNOSIS — L71.9 ROSACEA: ICD-10-CM

## 2024-10-21 DIAGNOSIS — Z00.00 ROUTINE MEDICAL EXAM: Primary | ICD-10-CM

## 2024-10-21 DIAGNOSIS — F41.9 ANXIETY: ICD-10-CM

## 2024-10-21 RX ORDER — METRONIDAZOLE 7.5 MG/G
1 LOTION TOPICAL 2 TIMES DAILY
Qty: 60 ML | Refills: 11 | Status: SHIPPED | OUTPATIENT
Start: 2024-10-21

## 2024-10-21 NOTE — PROGRESS NOTES
HPI:   Masoud Killian is a 24 year old female who presents for a complete physical exam.    Reports anxiety is better - was worse while traveling. Not as constant. Only taking hydroxyzine when more anxious. Been exercising.   Reports acne off and on. Always redness and texture.   Exercising 3 times a week.   Declines flu shot.   Wt Readings from Last 3 Encounters:   10/21/24 128 lb 3.2 oz (58.2 kg)   09/23/24 127 lb 12.8 oz (58 kg)   09/20/24 124 lb 8 oz (56.5 kg)     Body mass index is 23.83 kg/m².       Current Outpatient Medications   Medication Sig Dispense Refill    hydrOXYzine 25 MG Oral Tab Take 1 tablet (25 mg total) by mouth every 8 (eight) hours as needed. 30 tablet 0      History reviewed. No pertinent past medical history.   History reviewed. No pertinent surgical history.   Family History   Family history unknown: Yes      Social History:   Social History     Socioeconomic History    Marital status: Single   Tobacco Use    Smoking status: Never     Passive exposure: Never    Smokeless tobacco: Never   Vaping Use    Vaping status: Never Used   Substance and Sexual Activity    Alcohol use: Yes     Comment: occassionally    Drug use: Never   Other Topics Concern    Reaction to local anesthetic No    Pt has a pacemaker No    Pt has a defibrillator No          REVIEW OF SYSTEMS:   GENERAL: feels well otherwise  Review of Systems   See HPI   EXAM:   /79   Pulse 78   Ht 5' 1.5\" (1.562 m)   Wt 128 lb 3.2 oz (58.2 kg)   LMP 09/14/2024   BMI 23.83 kg/m²     GENERAL: well developed, well nourished,in no apparent distress  SKIN: bilateral cheeks mild erythema and tiny papular  HEENT: atraumatic, normocephalic,ears and throat are clear  EYES:PERRLA, EOMI, conjunctiva are clear  LUNGS: clear to auscultation  CARDIO: RRR without murmur  GI: good BS's,no masses, HSM or tenderness  EXTREMITIES: no cyanosis, clubbing or edema  NEURO: Oriented times three,cranial nerves are intact,motor and sensory are grossly  intact    ASSESSMENT AND PLAN:   Masoud Killian is a 24 year old female who presents for a complete physical exam.    1. Routine medical exam    - CBC With Differential With Platelet; Future  - Comp Metabolic Panel (14); Future  - Lipid Panel; Future  - TSH W Reflex To Free T4; Future    2. Anxiety      3. Rosacea  Metronidazole BID.    - Derm Referral - In Network    Hanna Schreiber MD  10/21/2024  2:57 PM

## 2024-10-22 NOTE — TELEPHONE ENCOUNTER
Spoke to the pharmacy- Insurance will only cover metronidazole gel or cream NOT lotion-please advise

## 2024-10-22 NOTE — TELEPHONE ENCOUNTER
Pharmacy is requesting alternative for the following medication as it is not covered by the insurance:       metroNIDAZOLE 0.75 % External Lotion, Apply 1 Application topically in the morning and 1 Application before bedtime., Disp: 60 mL, Rfl: 11

## 2024-10-29 ENCOUNTER — HOSPITAL ENCOUNTER (OUTPATIENT)
Age: 24
Discharge: HOME | End: 2024-10-29
Payer: COMMERCIAL

## 2024-10-29 VITALS
SYSTOLIC BLOOD PRESSURE: 127 MMHG | HEART RATE: 75 BPM | DIASTOLIC BLOOD PRESSURE: 77 MMHG | OXYGEN SATURATION: 100 % | RESPIRATION RATE: 18 BRPM | TEMPERATURE: 97 F

## 2024-10-29 DIAGNOSIS — H57.89 REDNESS OF EYE, RIGHT: Primary | ICD-10-CM

## 2024-10-29 PROCEDURE — 99213 OFFICE O/P EST LOW 20 MIN: CPT | Performed by: NURSE PRACTITIONER

## 2024-10-29 RX ORDER — OFLOXACIN 3 MG/ML
2 SOLUTION/ DROPS OPHTHALMIC 4 TIMES DAILY
Qty: 10 ML | Refills: 0 | Status: SHIPPED | OUTPATIENT
Start: 2024-10-29 | End: 2024-11-05

## 2024-10-29 NOTE — DISCHARGE INSTRUCTIONS
Recommend over-the-counter Pataday eyedrops for eye itchiness and redness as the symptoms you are experiencing could be a viral pinkeye meaning that it will go away on its own you may take oral over-the-counter Zyrtec.  If you develop entire eye redness could be drainage that is continuous throughout the day over the next 24 to 48 hours this would be concerning for bacterial conjunctivitis start the antibiotic drops as prescribed.  If all of a sudden you develop entire outer eye swelling the skin is red or hot to touch you cannot open the eye having severe eye pain feel like you are losing your vision headache or fever go to the nearest emergency department.

## 2024-10-29 NOTE — ED PROVIDER NOTES
Patient Seen in: [facility]      History     Chief Complaint   Patient presents with    Eye Problem     Stated Complaint: Red eye    Subjective:   HPI      This is a 24-year-old female presenting with eye redness.  Patient states this morning she woke up with eye looking pink with some goopy drainage went back to sleep and then had more drainage and crusting but the eye appears to be better now but was concerned about pinkeye so came in to be evaluated.  Denies any blurry vision or vision changes denies any eye injury or trauma.  Does not wear contact lenses or glasses.  Patient states there is slight itchiness of the eye.    Objective:     No pertinent past medical history.            No pertinent past surgical history.              No pertinent social history.            Review of Systems    Positive for stated complaint: Red eye  Other systems are as noted in HPI.  Constitutional and vital signs reviewed.      All other systems reviewed and negative except as noted above.    Physical Exam     ED Triage Vitals [10/29/24 0956]   /77   Pulse 75   Resp 18   Temp 97.4 °F (36.3 °C)   Temp src Temporal   SpO2 100 %   O2 Device None (Room air)       Current Vitals:   Vital Signs  BP: 127/77  Pulse: 75  Resp: 18  Temp: 97.4 °F (36.3 °C)  Temp src: Temporal    Oxygen Therapy  SpO2: 100 %  O2 Device: None (Room air)      Right Eye Chart Acuity: 20/20, Uncorrected  Left Eye Chart Acuity: 20/20, Uncorrected  Physical Exam  Vitals and nursing note reviewed.   Constitutional:       Appearance: Normal appearance.   HENT:      Right Ear: External ear normal.      Left Ear: External ear normal.      Nose: Nose normal.      Mouth/Throat:      Mouth: Mucous membranes are moist.      Pharynx: Oropharynx is clear.   Eyes:      General:         Right eye: No discharge or hordeolum.         Left eye: No discharge or hordeolum.      Extraocular Movements: Extraocular movements intact.      Conjunctiva/sclera:  Conjunctivae normal.      Right eye: Right conjunctiva is not injected.      Left eye: Left conjunctiva is not injected.      Pupils: Pupils are equal, round, and reactive to light.      Comments: No orbital swelling   Musculoskeletal:         General: Normal range of motion.      Cervical back: Normal range of motion.   Skin:     General: Skin is warm.      Capillary Refill: Capillary refill takes less than 2 seconds.   Neurological:      General: No focal deficit present.      Mental Status: She is alert and oriented to person, place, and time.             ED Course   Labs Reviewed - No data to display            MDM         Medical Decision Making  24-year-old female well-appearing and nontoxic presenting with eye redness and discharge this morning.  DDx viral versus bacterial versus allergic conjunctivitis versus conjunctival hemorrhage.  No clinical indication for labs or imaging.  Physical exam is essentially unremarkable discussed the possibility of a viral conjunctivitis meaning that it will resolve on its own and recommended over-the-counter medications.  Discussed prescribing ofloxacin eyedrops only if she gets more eye redness and goopy drainage that is goopy throughout the day at that time it will be concerning for bacterial conjunctivitis and may start the ofloxacin drops.  Discussed outpatient follow-up ER precautions with any new or worsening symptoms.  Patient acknowledges understanding discharge instructions.    Problems Addressed:  Redness of eye, right: acute illness or injury    Risk  OTC drugs.  Prescription drug management.        Disposition and Plan     Clinical Impression:  1. Redness of eye, right         Disposition:  Discharge  10/29/2024 10:04 am    Follow-up:  Hanna Schreiber MD  [address] [place] [address]  [address]  [facility] [id number]  [phone]      As needed    Bernardo Coelho MD  [address] [place] [address]  [address]  [facility] [address]  [phone]      Ophthalmologist to follow-up  with if symptoms persist or worsen          Medications Prescribed:  Current Discharge Medication List        START taking these medications    Details   ofloxacin 0.3 % Ophthalmic Solution Place 2 drops into the right eye 4 (four) times daily for 7 days.  Qty: 10 mL, Refills: 0    Associated Diagnoses: Redness of eye, right                 Supplementary Documentation:

## 2024-11-27 ENCOUNTER — TELEPHONE (OUTPATIENT)
Dept: FAMILY MEDICINE CLINIC | Facility: CLINIC | Age: 24
End: 2024-11-27

## 2024-11-27 NOTE — TELEPHONE ENCOUNTER
Pt advised appt , not sexually active, advise will not need STI just a PAP if needed     Hi Dr Schreiber,      Apologies for the back and forth here but I’m still having some discomfort that is occurring around my menstrual cycle and would actually like to do an STI screening / pap smear, just to make sure and get some peace of mind.      I see you are booked out until January 15, would it be possible to schedule something prior to that in December ?     Thank you! Have a peterson Thanksgiving in the meantime.      Best regards,   Masoud

## 2024-12-16 ENCOUNTER — OFFICE VISIT (OUTPATIENT)
Dept: FAMILY MEDICINE CLINIC | Facility: CLINIC | Age: 24
End: 2024-12-16

## 2024-12-16 ENCOUNTER — LAB ENCOUNTER (OUTPATIENT)
Dept: LAB | Age: 24
End: 2024-12-16
Attending: FAMILY MEDICINE
Payer: COMMERCIAL

## 2024-12-16 VITALS
BODY MASS INDEX: 24.08 KG/M2 | HEIGHT: 61.5 IN | HEART RATE: 130 BPM | WEIGHT: 129.19 LBS | SYSTOLIC BLOOD PRESSURE: 135 MMHG | DIASTOLIC BLOOD PRESSURE: 87 MMHG

## 2024-12-16 DIAGNOSIS — Z01.419 ENCOUNTER FOR WELL WOMAN EXAM WITH ROUTINE GYNECOLOGICAL EXAM: ICD-10-CM

## 2024-12-16 DIAGNOSIS — N89.8 VAGINAL IRRITATION: ICD-10-CM

## 2024-12-16 DIAGNOSIS — Z01.419 ENCOUNTER FOR WELL WOMAN EXAM WITH ROUTINE GYNECOLOGICAL EXAM: Primary | ICD-10-CM

## 2024-12-16 LAB
HCV AB SERPL QL IA: NONREACTIVE
T PALLIDUM AB SER QL IA: NONREACTIVE

## 2024-12-16 PROCEDURE — 87491 CHLMYD TRACH DNA AMP PROBE: CPT | Performed by: FAMILY MEDICINE

## 2024-12-16 PROCEDURE — 36415 COLL VENOUS BLD VENIPUNCTURE: CPT

## 2024-12-16 PROCEDURE — 86780 TREPONEMA PALLIDUM: CPT

## 2024-12-16 PROCEDURE — 87389 HIV-1 AG W/HIV-1&-2 AB AG IA: CPT

## 2024-12-16 PROCEDURE — 81514 NFCT DS BV&VAGINITIS DNA ALG: CPT | Performed by: FAMILY MEDICINE

## 2024-12-16 PROCEDURE — 86803 HEPATITIS C AB TEST: CPT

## 2024-12-16 PROCEDURE — 86696 HERPES SIMPLEX TYPE 2 TEST: CPT

## 2024-12-16 PROCEDURE — 86695 HERPES SIMPLEX TYPE 1 TEST: CPT

## 2024-12-16 PROCEDURE — 87591 N.GONORRHOEAE DNA AMP PROB: CPT | Performed by: FAMILY MEDICINE

## 2024-12-16 NOTE — PROGRESS NOTES
HPI:   Masoud Killian is a 24 year old female who presents for a complete physical exam.   Patient's last menstrual period was 09/14/2024.  Still having some burning off and on. Last menses ended 12/11/24. Had burning before but now it is ok.   Wt Readings from Last 3 Encounters:   12/16/24 129 lb 3.2 oz (58.6 kg)   10/21/24 128 lb 3.2 oz (58.2 kg)   09/23/24 127 lb 12.8 oz (58 kg)     Body mass index is 24.02 kg/m².     Cholesterol, Total (mg/dL)   Date Value   05/27/2023 151   11/24/2021 178     HDL Cholesterol (mg/dL)   Date Value   05/27/2023 72 (H)   11/24/2021 62 (H)     LDL Cholesterol (mg/dL)   Date Value   05/27/2023 69   11/24/2021 101 (H)     AST (U/L)   Date Value   05/27/2023 12 (L)   11/24/2021 12 (L)     ALT (U/L)   Date Value   05/27/2023 17   11/24/2021 18        Current Outpatient Medications   Medication Sig Dispense Refill    metRONIDAZOLE 0.75 % External Cream Apply 1 Application topically in the morning and 1 Application before bedtime. 45 g 11    metroNIDAZOLE 0.75 % External Lotion Apply 1 Application topically in the morning and 1 Application before bedtime. 60 mL 11    hydrOXYzine 25 MG Oral Tab Take 1 tablet (25 mg total) by mouth every 8 (eight) hours as needed. 30 tablet 0      History reviewed. No pertinent past medical history.   History reviewed. No pertinent surgical history.   Family History   Family history unknown: Yes      Social History:   Social History     Socioeconomic History    Marital status: Single   Tobacco Use    Smoking status: Never     Passive exposure: Never    Smokeless tobacco: Never   Vaping Use    Vaping status: Never Used   Substance and Sexual Activity    Alcohol use: Yes     Comment: occassionally    Drug use: Never   Other Topics Concern    Reaction to local anesthetic No    Pt has a pacemaker No    Pt has a defibrillator No     Exercise:  Diet:     REVIEW OF SYSTEMS:   GENERAL: feels well otherwise  Review of Systems   See HPI   EXAM:   /87   Pulse  RTW LETTER HAS BEEN UPLOADED.   (!) 130   Ht 5' 1.5\" (1.562 m)   Wt 129 lb 3.2 oz (58.6 kg)   LMP 09/14/2024   BMI 24.02 kg/m²   Body mass index is 24.02 kg/m².   GENERAL: well developed, well nourished,in no apparent distress  :introitus is normal,no discharge,cervix is pink,no adnexal masses or tenderness, PAP was done         ASSESSMENT AND PLAN:   Masoud Killian is a 24 year old female who presents for a complete physical exam.   1. Encounter for well woman exam with routine gynecological exam    - ThinPrep Pap with HPV Reflex, Chlamydia/GC; Future  - Chlamydia/Gc Amplification; Future     Pap and pelvic done. self breast exam explained. Health maintenance. Pt' s weight is Body mass index is 24.02 kg/m²., recommended low fat diet and aerobic exercise 30 minutes three times weekly.  The patient indicates understanding of these issues and agrees to the plan.  No orders of the defined types were placed in this encounter.    Hanna Schreiber MD

## 2024-12-17 LAB
BV BACTERIA DNA VAG QL NAA+PROBE: NEGATIVE
C GLABRATA DNA VAG QL NAA+PROBE: NEGATIVE
C KRUSEI DNA VAG QL NAA+PROBE: NEGATIVE
C TRACH DNA SPEC QL NAA+PROBE: NEGATIVE
CANDIDA DNA VAG QL NAA+PROBE: NEGATIVE
N GONORRHOEA DNA SPEC QL NAA+PROBE: NEGATIVE
T VAGINALIS DNA VAG QL NAA+PROBE: NEGATIVE

## 2024-12-18 LAB
HSV 1 GLYCOPROTEIN G, IGG: NEGATIVE
HSV 2 GLYCOPROTEIN G, IGG: NEGATIVE

## 2025-03-12 ENCOUNTER — PATIENT MESSAGE (OUTPATIENT)
Dept: FAMILY MEDICINE CLINIC | Facility: CLINIC | Age: 25
End: 2025-03-12

## 2025-04-07 DIAGNOSIS — F41.9 ANXIETY: ICD-10-CM

## 2025-04-10 RX ORDER — HYDROXYZINE HYDROCHLORIDE 25 MG/1
25 TABLET, FILM COATED ORAL EVERY 8 HOURS PRN
Qty: 30 TABLET | Refills: 0 | Status: SHIPPED | OUTPATIENT
Start: 2025-04-10

## 2025-04-10 NOTE — TELEPHONE ENCOUNTER
Review pended refill request as it does not fall under a protocol.    Last Rx:05/06/24    Requested Prescriptions   Pending Prescriptions Disp Refills    hydrOXYzine 25 MG Oral Tab 30 tablet 0     Sig: Take 1 tablet (25 mg total) by mouth every 8 (eight) hours as needed.       There is no refill protocol information for this order

## 2025-04-21 ENCOUNTER — OFFICE VISIT (OUTPATIENT)
Dept: DERMATOLOGY CLINIC | Facility: CLINIC | Age: 25
End: 2025-04-21

## 2025-04-21 DIAGNOSIS — L71.9 ROSACEA: Primary | ICD-10-CM

## 2025-04-21 RX ORDER — METRONIDAZOLE 10 MG/G
1 GEL TOPICAL 2 TIMES DAILY
Qty: 60 G | Refills: 1 | Status: SHIPPED | OUTPATIENT
Start: 2025-04-21

## 2025-04-21 NOTE — PROGRESS NOTES
HPI:    Patient ID: Masoud Killian is a 24 year old female.    Patient presents for rosacea flare up. Patient has been using metronidazole but hasn't seen any improvement. No draining or tenderness noted. No allergies to medications noted. She did not feel the metronidazdole helped. No itching. Mostly redness.         Review of Systems   Constitutional:  Negative for chills and fever.   Musculoskeletal:  Negative for arthralgias and myalgias.   Skin:  Positive for rash. Negative for color change and wound.          Current Medications[1]  Allergies:Allergies[2]   [facility] 09/14/2024   There is no height or weight on file to calculate BMI.  PHYSICAL EXAM:   Physical Exam  Constitutional:       General: She is not in acute distress.     Appearance: Normal appearance.   Skin:     General: Skin is warm and dry.      Findings: Rash present.      Comments: No papules noted. Erythema noted on the cheeks bilaterally. No scaling noted. No draining or tenderness noted.    Neurological:      Mental Status: She is alert and oriented to person, place, and time.                ASSESSMENT/PLAN:   1. Rosacea  -After discussion with patient, advised the following:  -Went over triggers  -Went over pathology of the condition.   -Encouraged sun protection   -Start metro gel 1%  -Educated to apply 2 times per day for the next 4-6 weeks  -If not improving then will consider soolantra.   -Would be a condition that will intermittently reappear.   -To call or follow-up with worsening symptoms or concenrs.   -Pt was agreeable to plan and will comply with discussion above.           No orders of the defined types were placed in this encounter.      Meds This Visit:  Requested Prescriptions     Signed Prescriptions Disp Refills    metroNIDAZOLE 1 % External Gel 60 g 1     Sig: Apply 1 Application topically in the morning and 1 Application before bedtime.       Imaging & Referrals:  None         ID#[id number]       [1]   Current Outpatient Medications    Medication Sig Dispense Refill    metroNIDAZOLE 1 % External Gel Apply 1 Application topically in the morning and 1 Application before bedtime. 60 g 1    hydrOXYzine 25 MG Oral Tab Take 1 tablet (25 mg total) by mouth every 8 (eight) hours as needed. 30 tablet 0    metRONIDAZOLE 0.75 % External Cream Apply 1 Application topically in the morning and 1 Application before bedtime. (Patient not taking: Reported on 4/21/2025) 45 g 11    metroNIDAZOLE 0.75 % External Lotion Apply 1 Application topically in the morning and 1 Application before bedtime. (Patient not taking: Reported on 4/21/2025) 60 mL 11   [2] No Known Allergies

## 2025-07-26 ENCOUNTER — HOSPITAL ENCOUNTER (OUTPATIENT)
Age: 25
Discharge: HOME | End: 2025-07-26
Payer: COMMERCIAL

## 2025-07-26 VITALS
BODY MASS INDEX: 23.22 KG/M2 | WEIGHT: 123 LBS | OXYGEN SATURATION: 100 % | TEMPERATURE: 98 F | HEIGHT: 61 IN | HEART RATE: 98 BPM | DIASTOLIC BLOOD PRESSURE: 80 MMHG | RESPIRATION RATE: 20 BRPM | SYSTOLIC BLOOD PRESSURE: 135 MMHG

## 2025-07-26 DIAGNOSIS — K64.4 EXTERNAL HEMORRHOID: Primary | ICD-10-CM

## 2025-07-26 RX ORDER — DOCUSATE SODIUM 100 MG/1
100 CAPSULE, LIQUID FILLED ORAL 2 TIMES DAILY PRN
Qty: 20 CAPSULE | Refills: 0 | Status: SHIPPED | OUTPATIENT
Start: 2025-07-26 | End: 2025-08-05

## 2025-07-26 RX ORDER — HYDROCORTISONE ACETATE PRAMOXINE HCL 1; 1 G/100G; G/100G
1 CREAM TOPICAL 2 TIMES DAILY
Qty: 30 G | Refills: 0 | Status: SHIPPED | OUTPATIENT
Start: 2025-07-26 | End: 2025-08-02

## 2025-07-26 NOTE — ED PROVIDER NOTES
Patient Seen in: [facility]    History     Chief Complaint   Patient presents with    Rectal Pain     Stated Complaint: -eval g    HPI    Masoud Killian is a 25 year old female who presents with chief complaint of hemorrhoid.  Onset 2.5 weeks ago.  Patient states she did experience anal pain at onset of symptoms, which has resolved.  Patient states she did experience intermittent bright red blood per rectum during bowel movements.  Patient reports intermittent constipation.  Patient denies fever, chills, abdominal pain, nausea, vomiting, diarrhea, melena, dysuria, hematuria, flank pain, vaginal bleeding, vaginal discharge, purulent drainage.            Past Medical History[1]    Past Surgical History[2]         Family History[3]    Short Social Hx on File[4]    Review of Systems    Positive for stated complaint: -eval g  Other systems are as noted in HPI.  Constitutional and vital signs reviewed.      All other systems reviewed and negative except as noted above.    PSFH elements reviewed from today and agreed except as otherwise stated in HPI.    Physical Exam     ED Triage Vitals [07/26/25 1352]   /80   Pulse (!) 123   Resp 20   Temp 98.4 °F (36.9 °C)   Temp src Oral   SpO2 100 %   O2 Device        Current:/80   Pulse 98   Temp 98.4 °F (36.9 °C) (Oral)   Resp 20   Ht 154.9 cm (5' 1\")   Wt 55.8 kg   LMP 07/25/2025   SpO2 100%   BMI 23.24 kg/m²     PULSE OX within normal limits on room air as interpreted by this provider.        Physical Exam    Constitutional: The patient is cooperative. Appears well-developed and well-nourished.  No acute distress.  Psychological: Alert, No abnormalities of mood, affect.  Head: Normocephalic/atraumatic.  Eyes: Pupils are equal round reactive to light.  Conjunctiva are within normal limits.  ENT: Oropharynx is clear.  Mucous membranes moist.  Neck: The neck is supple.  No meningeal signs.  Respiratory: Respiratory effort was normal.  There is no  stridor.  Air entry is equal.  Cardiovascular: Tachycardic, regular rhythm.  Capillary refill is brisk.    Gastrointestinal: Abdomen soft, nontender, nondistended.  There is no rebound tenderness or guarding.  No organomegaly is noted. No peritoneal signs.  Normal bowel sounds.  No McBurney point tenderness.  Negative Johns sign.  Two 5 mm, external, nonthrombosed, nontender hemorrhoids present at 5:00 and 11:00 of the anus and jackknife position.  No active bleeding or drainage.  Genitourinary: Not examined.  Lymphatic: No gross lymphadenopathy noted.  Musculoskeletal: Musculoskeletal system is grossly intact.  There is no obvious deformity.  Neurological: Gross motor movement is intact in all 4 extremities.  Patient exhibits normal speech.  Skin: Skin is normal to inspection, except as documented.  Warm and dry.  No obvious bruising.  No obvious rash.          ED Course   Labs Reviewed - No data to display    MDM     Differential diagnosis including but not limited to external hemorrhoid, internal hemorrhoid, thrombosed hemorrhoid, anal fissure    Physical exam remained stable as previously documented.  Physical exam findings discussed with patient.    I have given the patient instructions regarding their diagnoses, expectations, follow up, and ER precautions. I explained to the patient that emergent conditions may arise and to go to the ER for new, worsening or any persistent conditions. I've explained the importance of following up with their doctor as instructed. The patient verbalized understanding of the discharge instructions and plan.    Disposition and Plan     Clinical Impression:  1. External hemorrhoid        Disposition:  Discharge    Follow-up:  Hanna Schreiber MD  [address] [place] [id number]  [phone]    Call in 1 day  For follow-up      Medications Prescribed:  Current Discharge Medication List        START taking these medications    Details   Hydrocortisone  Ace-Pramoxine 1-1 % External Cream Apply 1 Application topically in the morning and 1 Application before bedtime. Do all this for 7 days.  Qty: 30 g, Refills: 0      docusate sodium 100 MG Oral Cap Take 1 capsule (100 mg total) by mouth 2 (two) times daily as needed for constipation.  Qty: 20 capsule, Refills: 0                            [1] History reviewed. No pertinent past medical history.  [2] History reviewed. No pertinent surgical history.  [3]   Family History  Family history unknown: Yes   [4]   Social History  Socioeconomic History    Marital status: Single   Tobacco Use    Smoking status: Never     Passive exposure: Never    Smokeless tobacco: Never   Vaping Use    Vaping status: Never Used   Substance and Sexual Activity    Alcohol use: Yes     Comment: occassionally    Drug use: Never   Other Topics Concern    Reaction to local anesthetic No    Pt has a pacemaker No    Pt has a defibrillator No

## 2025-07-26 NOTE — ED INITIAL ASSESSMENT (HPI)
Per pt. Possible hemorrhoid. Symptoms started 2.5 weeks ago that getting better. Noted rectal bleeding. No injury no trauma. No fever.

## 2025-08-10 ENCOUNTER — HOSPITAL ENCOUNTER (OUTPATIENT)
Age: 25
Discharge: HOME | End: 2025-08-10

## 2025-08-10 VITALS
TEMPERATURE: 98 F | HEART RATE: 95 BPM | SYSTOLIC BLOOD PRESSURE: 132 MMHG | RESPIRATION RATE: 18 BRPM | OXYGEN SATURATION: 100 % | DIASTOLIC BLOOD PRESSURE: 79 MMHG

## 2025-08-10 DIAGNOSIS — K64.4 EXTERNAL HEMORRHOID: Primary | ICD-10-CM

## 2025-08-10 PROCEDURE — 99213 OFFICE O/P EST LOW 20 MIN: CPT | Performed by: PHYSICIAN ASSISTANT

## 2025-08-11 ENCOUNTER — NURSE TRIAGE (OUTPATIENT)
Dept: FAMILY MEDICINE CLINIC | Facility: CLINIC | Age: 25
End: 2025-08-11

## 2025-08-12 ENCOUNTER — OFFICE VISIT (OUTPATIENT)
Dept: FAMILY MEDICINE CLINIC | Facility: CLINIC | Age: 25
End: 2025-08-12

## 2025-08-12 VITALS
BODY MASS INDEX: 23.67 KG/M2 | DIASTOLIC BLOOD PRESSURE: 73 MMHG | HEART RATE: 112 BPM | HEIGHT: 61 IN | WEIGHT: 125.38 LBS | SYSTOLIC BLOOD PRESSURE: 118 MMHG

## 2025-08-12 DIAGNOSIS — K64.9 HEMORRHOIDS, UNSPECIFIED HEMORRHOID TYPE: Primary | ICD-10-CM

## 2025-08-12 PROCEDURE — 3008F BODY MASS INDEX DOCD: CPT | Performed by: FAMILY MEDICINE

## 2025-08-12 PROCEDURE — 99214 OFFICE O/P EST MOD 30 MIN: CPT | Performed by: FAMILY MEDICINE

## 2025-08-12 PROCEDURE — 3078F DIAST BP <80 MM HG: CPT | Performed by: FAMILY MEDICINE

## 2025-08-12 PROCEDURE — 3074F SYST BP LT 130 MM HG: CPT | Performed by: FAMILY MEDICINE
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
## (undated) NOTE — LETTER
Date & Time: 7/26/2025, 2:03 PM  Patient: Masoud Killian  Encounter Provider(s):    Karlee Montalvo PA       To Whom It May Concern:    Masoud Killian was seen and treated in our department on 7/26/2025. Please allow patient to work from home.  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    If you have any questions or concerns, please do not hesitate to call.        _____________________________  Physician/APC Signature

## (undated) NOTE — LETTER
11/22/21        Masoud Killian  [address] [address]      Dear Jimenez Andino,    Our records indicate that you have outstanding lab work and or testing that was ordered for you and has not yet been completed:  Orders Placed This Encounter      Dominique
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
